# Patient Record
Sex: FEMALE | Race: BLACK OR AFRICAN AMERICAN | NOT HISPANIC OR LATINO | URBAN - METROPOLITAN AREA
[De-identification: names, ages, dates, MRNs, and addresses within clinical notes are randomized per-mention and may not be internally consistent; named-entity substitution may affect disease eponyms.]

---

## 2024-08-28 ENCOUNTER — INPATIENT (INPATIENT)
Facility: HOSPITAL | Age: 44
LOS: 2 days | Discharge: HOME | End: 2024-08-31
Attending: NEUROLOGICAL SURGERY | Admitting: NEUROLOGICAL SURGERY
Payer: COMMERCIAL

## 2024-08-28 VITALS
SYSTOLIC BLOOD PRESSURE: 130 MMHG | HEART RATE: 102 BPM | RESPIRATION RATE: 18 BRPM | WEIGHT: 293 LBS | OXYGEN SATURATION: 98 % | HEIGHT: 69 IN | DIASTOLIC BLOOD PRESSURE: 88 MMHG | TEMPERATURE: 100 F

## 2024-08-28 LAB
ADD ON TEST-SPECIMEN IN LAB: SIGNIFICANT CHANGE UP
ALBUMIN SERPL ELPH-MCNC: 4.3 G/DL — SIGNIFICANT CHANGE UP (ref 3.3–5)
ALP SERPL-CCNC: 60 U/L — SIGNIFICANT CHANGE UP (ref 40–120)
ALT FLD-CCNC: 14 U/L — SIGNIFICANT CHANGE UP (ref 10–45)
ANION GAP SERPL CALC-SCNC: 13 MMOL/L — SIGNIFICANT CHANGE UP (ref 5–17)
APPEARANCE UR: CLEAR — SIGNIFICANT CHANGE UP
AST SERPL-CCNC: 20 U/L — SIGNIFICANT CHANGE UP (ref 10–40)
BACTERIA # UR AUTO: NEGATIVE /HPF — SIGNIFICANT CHANGE UP
BASOPHILS # BLD AUTO: 0.02 K/UL — SIGNIFICANT CHANGE UP (ref 0–0.2)
BASOPHILS NFR BLD AUTO: 0.3 % — SIGNIFICANT CHANGE UP (ref 0–2)
BILIRUB SERPL-MCNC: 0.5 MG/DL — SIGNIFICANT CHANGE UP (ref 0.2–1.2)
BILIRUB UR-MCNC: NEGATIVE — SIGNIFICANT CHANGE UP
BLD GP AB SCN SERPL QL: NEGATIVE — SIGNIFICANT CHANGE UP
BUN SERPL-MCNC: 12 MG/DL — SIGNIFICANT CHANGE UP (ref 7–23)
CALCIUM SERPL-MCNC: 9.3 MG/DL — SIGNIFICANT CHANGE UP (ref 8.4–10.5)
CHLORIDE SERPL-SCNC: 105 MMOL/L — SIGNIFICANT CHANGE UP (ref 96–108)
CO2 SERPL-SCNC: 22 MMOL/L — SIGNIFICANT CHANGE UP (ref 22–31)
COLOR SPEC: YELLOW — SIGNIFICANT CHANGE UP
CREAT SERPL-MCNC: 0.76 MG/DL — SIGNIFICANT CHANGE UP (ref 0.5–1.3)
CRP SERPL-MCNC: 11.9 MG/L — HIGH (ref 0–4)
D DIMER BLD IA.RAPID-MCNC: <150 NG/ML DDU — SIGNIFICANT CHANGE UP
DIFF PNL FLD: NEGATIVE — SIGNIFICANT CHANGE UP
EGFR: 100 ML/MIN/1.73M2 — SIGNIFICANT CHANGE UP
EOSINOPHIL # BLD AUTO: 0.1 K/UL — SIGNIFICANT CHANGE UP (ref 0–0.5)
EOSINOPHIL NFR BLD AUTO: 1.6 % — SIGNIFICANT CHANGE UP (ref 0–6)
ERYTHROCYTE [SEDIMENTATION RATE] IN BLOOD: 33 MM/HR — HIGH
GLUCOSE BLDC GLUCOMTR-MCNC: 101 MG/DL — HIGH (ref 70–99)
GLUCOSE SERPL-MCNC: 87 MG/DL — SIGNIFICANT CHANGE UP (ref 70–99)
GLUCOSE UR QL: NEGATIVE MG/DL — SIGNIFICANT CHANGE UP
HCG SERPL-ACNC: <1 MIU/ML — SIGNIFICANT CHANGE UP
HCT VFR BLD CALC: 38 % — SIGNIFICANT CHANGE UP (ref 34.5–45)
HGB BLD-MCNC: 11.6 G/DL — SIGNIFICANT CHANGE UP (ref 11.5–15.5)
IMM GRANULOCYTES NFR BLD AUTO: 0.2 % — SIGNIFICANT CHANGE UP (ref 0–0.9)
KETONES UR-MCNC: 40 MG/DL
LACTATE SERPL-SCNC: 1.4 MMOL/L — SIGNIFICANT CHANGE UP (ref 0.5–2)
LEUKOCYTE ESTERASE UR-ACNC: ABNORMAL
LYMPHOCYTES # BLD AUTO: 1.61 K/UL — SIGNIFICANT CHANGE UP (ref 1–3.3)
LYMPHOCYTES # BLD AUTO: 25.7 % — SIGNIFICANT CHANGE UP (ref 13–44)
MCHC RBC-ENTMCNC: 24 PG — LOW (ref 27–34)
MCHC RBC-ENTMCNC: 30.5 GM/DL — LOW (ref 32–36)
MCV RBC AUTO: 78.5 FL — LOW (ref 80–100)
MONOCYTES # BLD AUTO: 0.51 K/UL — SIGNIFICANT CHANGE UP (ref 0–0.9)
MONOCYTES NFR BLD AUTO: 8.1 % — SIGNIFICANT CHANGE UP (ref 2–14)
NEUTROPHILS # BLD AUTO: 4.02 K/UL — SIGNIFICANT CHANGE UP (ref 1.8–7.4)
NEUTROPHILS NFR BLD AUTO: 64.1 % — SIGNIFICANT CHANGE UP (ref 43–77)
NITRITE UR-MCNC: NEGATIVE — SIGNIFICANT CHANGE UP
NRBC # BLD: 0 /100 WBCS — SIGNIFICANT CHANGE UP (ref 0–0)
NT-PROBNP SERPL-SCNC: <36 PG/ML — SIGNIFICANT CHANGE UP (ref 0–300)
PH UR: 6 — SIGNIFICANT CHANGE UP (ref 5–8)
PLATELET # BLD AUTO: 206 K/UL — SIGNIFICANT CHANGE UP (ref 150–400)
POTASSIUM SERPL-MCNC: 5 MMOL/L — SIGNIFICANT CHANGE UP (ref 3.5–5.3)
POTASSIUM SERPL-SCNC: 5 MMOL/L — SIGNIFICANT CHANGE UP (ref 3.5–5.3)
PROT SERPL-MCNC: 8 G/DL — SIGNIFICANT CHANGE UP (ref 6–8.3)
PROT UR-MCNC: NEGATIVE MG/DL — SIGNIFICANT CHANGE UP
RBC # BLD: 4.84 M/UL — SIGNIFICANT CHANGE UP (ref 3.8–5.2)
RBC # FLD: 15.7 % — HIGH (ref 10.3–14.5)
RBC CASTS # UR COMP ASSIST: 1 /HPF — SIGNIFICANT CHANGE UP (ref 0–4)
RH IG SCN BLD-IMP: POSITIVE — SIGNIFICANT CHANGE UP
SODIUM SERPL-SCNC: 140 MMOL/L — SIGNIFICANT CHANGE UP (ref 135–145)
SP GR SPEC: 1.02 — SIGNIFICANT CHANGE UP (ref 1–1.03)
SQUAMOUS # UR AUTO: 3 /HPF — SIGNIFICANT CHANGE UP (ref 0–5)
TROPONIN T, HIGH SENSITIVITY RESULT: <6 NG/L — SIGNIFICANT CHANGE UP (ref 0–51)
UROBILINOGEN FLD QL: 0.2 MG/DL — SIGNIFICANT CHANGE UP (ref 0.2–1)
WBC # BLD: 6.27 K/UL — SIGNIFICANT CHANGE UP (ref 3.8–10.5)
WBC # FLD AUTO: 6.27 K/UL — SIGNIFICANT CHANGE UP (ref 3.8–10.5)
WBC UR QL: 9 /HPF — HIGH (ref 0–5)

## 2024-08-28 PROCEDURE — 71046 X-RAY EXAM CHEST 2 VIEWS: CPT | Mod: 26

## 2024-08-28 PROCEDURE — 99285 EMERGENCY DEPT VISIT HI MDM: CPT

## 2024-08-28 PROCEDURE — 72157 MRI CHEST SPINE W/O & W/DYE: CPT | Mod: 26,MC

## 2024-08-28 PROCEDURE — 93010 ELECTROCARDIOGRAM REPORT: CPT

## 2024-08-28 RX ORDER — KETOROLAC TROMETHAMINE 30 MG/ML
15 INJECTION, SOLUTION INTRAMUSCULAR ONCE
Refills: 0 | Status: DISCONTINUED | OUTPATIENT
Start: 2024-08-28 | End: 2024-08-28

## 2024-08-28 RX ORDER — ACETAMINOPHEN 325 MG/1
650 TABLET ORAL ONCE
Refills: 0 | Status: COMPLETED | OUTPATIENT
Start: 2024-08-28 | End: 2024-08-28

## 2024-08-28 RX ORDER — SODIUM CHLORIDE 9 MG/ML
1000 INJECTION INTRAMUSCULAR; INTRAVENOUS; SUBCUTANEOUS
Refills: 0 | Status: DISCONTINUED | OUTPATIENT
Start: 2024-08-28 | End: 2024-08-29

## 2024-08-28 RX ORDER — SENNA 187 MG
2 TABLET ORAL AT BEDTIME
Refills: 0 | Status: DISCONTINUED | OUTPATIENT
Start: 2024-08-28 | End: 2024-08-29

## 2024-08-28 RX ORDER — POLYETHYLENE GLYCOL 3350 17 G/17G
17 POWDER, FOR SOLUTION ORAL DAILY
Refills: 0 | Status: DISCONTINUED | OUTPATIENT
Start: 2024-08-28 | End: 2024-08-31

## 2024-08-28 RX ORDER — SODIUM CHLORIDE 9 MG/ML
1000 INJECTION INTRAMUSCULAR; INTRAVENOUS; SUBCUTANEOUS ONCE
Refills: 0 | Status: COMPLETED | OUTPATIENT
Start: 2024-08-28 | End: 2024-08-28

## 2024-08-28 RX ORDER — SODIUM CHLORIDE 9 MG/ML
1000 INJECTION INTRAMUSCULAR; INTRAVENOUS; SUBCUTANEOUS
Refills: 0 | Status: DISCONTINUED | OUTPATIENT
Start: 2024-08-28 | End: 2024-08-28

## 2024-08-28 RX ADMIN — KETOROLAC TROMETHAMINE 15 MILLIGRAM(S): 30 INJECTION, SOLUTION INTRAMUSCULAR at 15:02

## 2024-08-28 RX ADMIN — KETOROLAC TROMETHAMINE 15 MILLIGRAM(S): 30 INJECTION, SOLUTION INTRAMUSCULAR at 19:44

## 2024-08-28 RX ADMIN — ACETAMINOPHEN 650 MILLIGRAM(S): 325 TABLET ORAL at 19:44

## 2024-08-28 RX ADMIN — SODIUM CHLORIDE 1000 MILLILITER(S): 9 INJECTION INTRAMUSCULAR; INTRAVENOUS; SUBCUTANEOUS at 19:53

## 2024-08-28 RX ADMIN — ACETAMINOPHEN 650 MILLIGRAM(S): 325 TABLET ORAL at 15:03

## 2024-08-28 RX ADMIN — SODIUM CHLORIDE 1000 MILLILITER(S): 9 INJECTION INTRAMUSCULAR; INTRAVENOUS; SUBCUTANEOUS at 15:02

## 2024-08-28 NOTE — ED PROVIDER NOTE - EKG #1 DATE/TIME
Problem: Falls - Risk of 
Goal: *Absence of Falls Document Christiano Leo Fall Risk and appropriate interventions in the flowsheet. Outcome: Progressing Towards Goal 
Fall Risk Interventions: 
Mobility Interventions: Communicate number of staff needed for ambulation/transfer Medication Interventions: Assess postural VS orthostatic hypotension Elimination Interventions: Call light in reach History of Falls Interventions: Consult care management for discharge planning 28-Aug-2024 14:50

## 2024-08-28 NOTE — H&P ADULT - NSHPPHYSICALEXAM_GEN_ALL_CORE
Constitutional: Pt found in bed in NAD   ENT: PERRL, EOMi  Neck: supple   Respiratory: breathing non-labored, symmetrical chest wall movement  Cardiovascuar: RRR, no murmurs  Gastrointestinal: abdomen soft, non tender  Extr: well  perfused  Skin: dry, warm   Neurological:  AAOX3. Verbal function intact  Cranial Nerves: II-XII intact  Motor: 5/5 power in b/l UE and LE  Sensation: intact to light touch in all extremities  Pronator Drift: negative

## 2024-08-28 NOTE — H&P ADULT - NSHPLABSRESULTS_GEN_ALL_CORE
< from: MR Thoracic Spine w/wo IV Cont (08.28.24 @ 18:11) >    IMPRESSION:    1.  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  2.  Correlate clinically to exclude steroid excess. Consider surgical   consultation.        Patient Name: ELOY SOUTH  MRN: [id number], Accession: [id number]  DOS: 08/28/24 06:11 PM    --- End of Report ---      < end of copied text >

## 2024-08-28 NOTE — ED PROVIDER NOTE - CLINICAL SUMMARY MEDICAL DECISION MAKING FREE TEXT BOX
Age appropriate behavior 44F denies PMH p/w pain/tingling. Pt returned from [facility] 6days ago. 4d ago she developed pain to back to area between shoulder blades, constant. Feels "shock" to that area when she takes a deep breath. Went to  where she had blood work taken "to evaluate for clot" and was reportedly normal. Was prescribed flexeril w/o relief. Since yesterday she also feels numbness/tingling to b/l abdomen, radiating down anterior RLE to knee region and down anterior LLE to distal leg. Denies any actual weakness but states that her gait is slightly off because of the tingling feeling in her lower extremities. No other systemic symptoms.   99.9 oral temp, borderline tachycardia, other vitals wnl. Exam as above.   ddx: Unclear etiology. Low suspicion PE (no SOB/CP). Low suspicion significant spinal pathology - however pt has low grade temp and seemingly bothersome subjective paresthesias. This raises possibility for compression or spinal infectious process.   Labs, d-dimer, MRI, IVF/attempt symptom control, reassess.

## 2024-08-28 NOTE — H&P ADULT - HISTORY OF PRESENT ILLNESS
45 yo female with no significant PMH presented to hospital c/o pain in her back in between her shoulder blades that started last Thursday 8/22, says it "feels like a shock" when she takes deep breaths. Pt went to urgent care and was prescribed muscle relaxants with no relief of symptoms. Pt reports pain then increased and new symptoms of b/l LE tingling and numbness to her anterior thighs with left being worse than right started 8/27. Pt stated tingling radiates down anterior aspect of LLE down to ankle and RLE down to knee. Denies saddles anesthesia, bowel/bladder incontinence, difficulty with ambulation or weakness.

## 2024-08-28 NOTE — ED ADULT TRIAGE NOTE - ARRIVAL FROM
ammonium lactate topical cream: Apply topically to affected area 2 times a day  Artificial Tears ophthalmic solution: 1 drop(s) to each affected eye 4 times a day  Dextrose 5% in Lactated Ringers intravenous solution: 100 milliliter(s) intravenous every hour   mirtazapine 15 mg oral tablet: 1 tab(s) orally once a day (at bedtime)  Multiple Vitamins oral tablet: 1 tab(s) orally once a day  Senna 8.6 mg oral tablet: 1 tab(s) orally once a day (at bedtime) Home

## 2024-08-28 NOTE — H&P ADULT - ASSESSMENT
45 yo female with no significant PMH presented to hospital c/o pain in her back in between her shoulder blades that started last Thursday 8/22, pain then increased and new symptoms of b/l LE tingling and numbness to her anterior thighs with left being worse than right started 8/27. Pt stated tingling radiates down anterior aspect of LLE down to ankle and RLE down to knee. Admit to [facility] for plan for OR 8/29 for L7-8 laminectomy.     Neuro:  - neuro/vital checks q1h  - MRI w/ and w/o 8/28: L5-7 epidural lesion  - plan for OR 8/29    Cardio:  - SBP <140    Pulm:  - RA    GI:  - NPO  - bowel regimen     Renal:  - IVF   - voiding    Endo:  - ISS   - f/u A1C    Heme:   - SCDs for DVT ppx    ID:  - afebrile   - elevated ESR/CRP     D/w Dr. D'amico

## 2024-08-28 NOTE — ED PROVIDER NOTE - OBJECTIVE STATEMENT
44F denies PMH p/w pain/tingling. Pt returned from [facility] 6days ago. 4d ago she developed pain to back to area between shoulder blades, constant. Feels "shock" to that area when she takes a deep breath. Went to  where she had blood work taken "to evaluate for clot" and was reportedly normal. Was prescribed flexeril w/o relief. Since yesterday she also feels numbness/tingling to b/l abdomen, radiating down anterior RLE to knee region and down anterior LLE to distal leg. Denies any actual weakness but states that her gait is slightly off because of the tingling feeling in her lower extremities. No other systemic symptoms.   Denies lower back pain or other back pain. Denies HA, URI symptoms, dizziness/lightheaded, f/c, SOB, CP, NVD, abd pain, urinary complaints, incontinence. Cannot recall specific precipitating trauma.

## 2024-08-28 NOTE — ED ADULT NURSE NOTE - OBJECTIVE STATEMENT
Patient c/o of 2 weeks increasing lower back pain, radiates to abdomen and bilateral lower legs w/ numbness and tingling, no neck tenderness no bladder/bowel dysfunction, States symptoms started after travel to [facility] 2 weeks ago.  Went to , tested negative for blood clot.  Was prescribed muscle relaxants, taking Ibuprofen w/ some relief, none taken today.

## 2024-08-28 NOTE — H&P ADULT - NSHPREVIEWOFSYSTEMS_GEN_ALL_CORE
General:	no recent illnesses, no recent wt gain/loss, no chills  Skin/Breast:  no rash, lumps, new moles, erythema, tenderness  Ophthalmologic:  no change in vision, diplopia, pain, redness, tearing, dry eyes	  ENMT:	no hearing loss, tinnitus, ear pain, vertigo, nasal congestion, epistaxis, sore throat  Respiratory and Thorax: no coughing, wheezing, recent URI, shortness of breath	  Cardiovascular: no chest pain, MCFARLANE, leg swelling, irregular rhythm   Gastrointestinal:	no nausea, vomiting, diarrhea, abd pain, bloody stool, heartburn  Genitourinary: no frequency, dysuria, hematuria  Musculoskeletal:	no joint pain, no joint swelling, no tenderness  Neurological:	 see HPI  Psychiatric:	no confusion, no anxiousness, no depression   Hematology/Lymphatics:	no brusing, easy bleeding, LAD  Endocrine:  	no excess urination/thirst, heat/cold intolerance  Allergic/Immunologic:  no urticaria, sneezing, recurrent infections

## 2024-08-28 NOTE — ED PROVIDER NOTE - PROGRESS NOTE DETAILS
Klepfish: 100.1 rectal temp, CRP 11.9, ESR 33, other labs including d-dimer grossly wnl. CXR wnl. Pt just went to MRI. Will reassess. Klepfish: UA w/ small leuk esterase, 9 WBCs, negative bacteria - has no urinary complaints, very unlikely UTI. MRI showed "IMPRESSION: 1.  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  2.  Correlate clinically to exclude steroid excess. Consider surgical consultation." Neurosurg consulted. Updated pt. Lars: Rediscussed w/ neurosurg, will admit for further care. Updated pt/ at bedside.

## 2024-08-28 NOTE — ED PROVIDER NOTE - HIV OFFER
Opt out Partial Purse String (Simple) Text: Given the location of the defect and the characteristics of the surrounding skin a simple purse string closure was deemed most appropriate.  Undermining was performed circumfirentially around the surgical defect.  A purse string suture was then placed and tightened. Wound tension only allowed a partial closure of the circular defect.

## 2024-08-28 NOTE — ED PROVIDER NOTE - PHYSICAL EXAMINATION
PERRL, EOMI, no nystagmus. CN intact. Strength 5/5. Steady unassisted gait. No pronator drift. Sensation intact. Normal speech, no dysarthria. No temporal ttp, no nuchal rigidity. Negative Romberg.  normal dorsi/plantar flexion, no saddle anesthesia   No spinal ttp, neck FROM. Strength 5/5. No bony ttp, FROM all extremities. Normal equal distal pulses. Steady unassisted gait.

## 2024-08-29 PROBLEM — Z00.00 ENCOUNTER FOR PREVENTIVE HEALTH EXAMINATION: Status: ACTIVE | Noted: 2024-08-29

## 2024-08-29 LAB
A1C WITH ESTIMATED AVERAGE GLUCOSE RESULT: 5.1 % — SIGNIFICANT CHANGE UP (ref 4–5.6)
ANION GAP SERPL CALC-SCNC: 9 MMOL/L — SIGNIFICANT CHANGE UP (ref 5–17)
BLD GP AB SCN SERPL QL: NEGATIVE — SIGNIFICANT CHANGE UP
BUN SERPL-MCNC: 9 MG/DL — SIGNIFICANT CHANGE UP (ref 7–23)
CALCIUM SERPL-MCNC: 8.8 MG/DL — SIGNIFICANT CHANGE UP (ref 8.4–10.5)
CHLORIDE SERPL-SCNC: 105 MMOL/L — SIGNIFICANT CHANGE UP (ref 96–108)
CO2 SERPL-SCNC: 22 MMOL/L — SIGNIFICANT CHANGE UP (ref 22–31)
CREAT SERPL-MCNC: 0.78 MG/DL — SIGNIFICANT CHANGE UP (ref 0.5–1.3)
EGFR: 97 ML/MIN/1.73M2 — SIGNIFICANT CHANGE UP
ESTIMATED AVERAGE GLUCOSE: 100 MG/DL — SIGNIFICANT CHANGE UP (ref 68–114)
GLUCOSE BLDC GLUCOMTR-MCNC: 93 MG/DL — SIGNIFICANT CHANGE UP (ref 70–99)
GLUCOSE BLDC GLUCOMTR-MCNC: 96 MG/DL — SIGNIFICANT CHANGE UP (ref 70–99)
GLUCOSE BLDC GLUCOMTR-MCNC: 99 MG/DL — SIGNIFICANT CHANGE UP (ref 70–99)
GLUCOSE SERPL-MCNC: 100 MG/DL — HIGH (ref 70–99)
HAV IGM SER-ACNC: SIGNIFICANT CHANGE UP
HBV CORE AB SER-ACNC: SIGNIFICANT CHANGE UP
HBV CORE IGM SER-ACNC: SIGNIFICANT CHANGE UP
HBV SURFACE AB SER-ACNC: SIGNIFICANT CHANGE UP
HBV SURFACE AG SER-ACNC: SIGNIFICANT CHANGE UP
HCT VFR BLD CALC: 35 % — SIGNIFICANT CHANGE UP (ref 34.5–45)
HCV AB S/CO SERPL IA: 0.05 S/CO — SIGNIFICANT CHANGE UP
HCV AB SERPL-IMP: SIGNIFICANT CHANGE UP
HGB BLD-MCNC: 11 G/DL — LOW (ref 11.5–15.5)
HIV 1+2 AB+HIV1 P24 AG SERPL QL IA: SIGNIFICANT CHANGE UP
LDH SERPL L TO P-CCNC: 189 U/L — SIGNIFICANT CHANGE UP (ref 50–242)
MAGNESIUM SERPL-MCNC: 2 MG/DL — SIGNIFICANT CHANGE UP (ref 1.6–2.6)
MCHC RBC-ENTMCNC: 24.7 PG — LOW (ref 27–34)
MCHC RBC-ENTMCNC: 31.4 GM/DL — LOW (ref 32–36)
MCV RBC AUTO: 78.7 FL — LOW (ref 80–100)
NRBC # BLD: 0 /100 WBCS — SIGNIFICANT CHANGE UP (ref 0–0)
PHOSPHATE SERPL-MCNC: 2.9 MG/DL — SIGNIFICANT CHANGE UP (ref 2.5–4.5)
PLATELET # BLD AUTO: 211 K/UL — SIGNIFICANT CHANGE UP (ref 150–400)
POTASSIUM SERPL-MCNC: 4.1 MMOL/L — SIGNIFICANT CHANGE UP (ref 3.5–5.3)
POTASSIUM SERPL-SCNC: 4.1 MMOL/L — SIGNIFICANT CHANGE UP (ref 3.5–5.3)
RBC # BLD: 4.45 M/UL — SIGNIFICANT CHANGE UP (ref 3.8–5.2)
RBC # FLD: 15.7 % — HIGH (ref 10.3–14.5)
RH IG SCN BLD-IMP: POSITIVE — SIGNIFICANT CHANGE UP
SODIUM SERPL-SCNC: 136 MMOL/L — SIGNIFICANT CHANGE UP (ref 135–145)
URATE SERPL-MCNC: 4.5 MG/DL — SIGNIFICANT CHANGE UP (ref 2.5–7)
WBC # BLD: 7.41 K/UL — SIGNIFICANT CHANGE UP (ref 3.8–10.5)
WBC # FLD AUTO: 7.41 K/UL — SIGNIFICANT CHANGE UP (ref 3.8–10.5)

## 2024-08-29 PROCEDURE — 71260 CT THORAX DX C+: CPT | Mod: 26

## 2024-08-29 PROCEDURE — 99291 CRITICAL CARE FIRST HOUR: CPT

## 2024-08-29 PROCEDURE — 74177 CT ABD & PELVIS W/CONTRAST: CPT | Mod: 26

## 2024-08-29 PROCEDURE — 99231 SBSQ HOSP IP/OBS SF/LOW 25: CPT

## 2024-08-29 PROCEDURE — 99222 1ST HOSP IP/OBS MODERATE 55: CPT

## 2024-08-29 PROCEDURE — 78815 PET IMAGE W/CT SKULL-THIGH: CPT | Mod: 26,PI

## 2024-08-29 RX ORDER — CHLORHEXIDINE GLUCONATE 40 MG/ML
1 SOLUTION TOPICAL EVERY 12 HOURS
Refills: 0 | Status: COMPLETED | OUTPATIENT
Start: 2024-08-29 | End: 2024-08-29

## 2024-08-29 RX ORDER — APREPITANT 40 MG/1
40 CAPSULE ORAL ONCE
Refills: 0 | Status: DISCONTINUED | OUTPATIENT
Start: 2024-08-29 | End: 2024-08-29

## 2024-08-29 RX ORDER — CHLORHEXIDINE GLUCONATE 40 MG/ML
1 SOLUTION TOPICAL
Refills: 0 | Status: DISCONTINUED | OUTPATIENT
Start: 2024-08-29 | End: 2024-08-31

## 2024-08-29 RX ORDER — ACETAMINOPHEN 325 MG/1
1000 TABLET ORAL EVERY 8 HOURS
Refills: 0 | Status: DISCONTINUED | OUTPATIENT
Start: 2024-08-29 | End: 2024-08-31

## 2024-08-29 RX ORDER — SODIUM CHLORIDE 9 MG/ML
1000 INJECTION INTRAMUSCULAR; INTRAVENOUS; SUBCUTANEOUS
Refills: 0 | Status: DISCONTINUED | OUTPATIENT
Start: 2024-08-29 | End: 2024-08-29

## 2024-08-29 RX ORDER — POVIDONE-IODINE 10 %
1 SOLUTION, NON-ORAL TOPICAL ONCE
Refills: 0 | Status: DISCONTINUED | OUTPATIENT
Start: 2024-08-29 | End: 2024-08-29

## 2024-08-29 RX ORDER — SODIUM CHLORIDE 9 MG/ML
1000 INJECTION INTRAMUSCULAR; INTRAVENOUS; SUBCUTANEOUS
Refills: 0 | Status: DISCONTINUED | OUTPATIENT
Start: 2024-08-29 | End: 2024-08-30

## 2024-08-29 RX ORDER — ACETAMINOPHEN 325 MG/1
1000 TABLET ORAL ONCE
Refills: 0 | Status: DISCONTINUED | OUTPATIENT
Start: 2024-08-29 | End: 2024-08-29

## 2024-08-29 RX ADMIN — CHLORHEXIDINE GLUCONATE 1 APPLICATION(S): 40 SOLUTION TOPICAL at 06:40

## 2024-08-29 RX ADMIN — CHLORHEXIDINE GLUCONATE 1 APPLICATION(S): 40 SOLUTION TOPICAL at 17:22

## 2024-08-29 RX ADMIN — SODIUM CHLORIDE 150 MILLILITER(S): 9 INJECTION INTRAMUSCULAR; INTRAVENOUS; SUBCUTANEOUS at 00:33

## 2024-08-29 RX ADMIN — SODIUM CHLORIDE 100 MILLILITER(S): 9 INJECTION INTRAMUSCULAR; INTRAVENOUS; SUBCUTANEOUS at 02:28

## 2024-08-29 NOTE — PROGRESS NOTE ADULT - SUBJECTIVE AND OBJECTIVE BOX
INTERVAL HISTORY: HPI:  43 yo female with no significant PMH presented to hospital c/o pain in her back in between her shoulder blades that started last Thursday 8/22, says it "feels like a shock" when she takes deep breaths. Pt went to urgent care and was prescribed muscle relaxants with no relief of symptoms. Pt reports pain then increased and new symptoms of b/l LE tingling and numbness to her anterior thighs with left being worse than right started 8/27. Pt stated tingling radiates down anterior aspect of LLE down to ankle and RLE down to knee. Denies saddles anesthesia, bowel/bladder incontinence, difficulty with ambulation or weakness.  (28 Aug 2024 23:43)      MEDICATIONS  (STANDING):  chlorhexidine 2% Cloths 1 Application(s) Topical <User Schedule>  polyethylene glycol 3350 17 Gram(s) Oral daily  sodium chloride 0.9%. 1000 milliLiter(s) (75 mL/Hr) IV Continuous <Continuous>    MEDICATIONS  (PRN):  acetaminophen     Tablet .. 1000 milliGRAM(s) Oral every 8 hours PRN Mild Pain (1 - 3)      Drug Dosing Weight  Height (cm): 175.3 (28 Aug 2024 13:48)  Weight (kg): 141.5 (28 Aug 2024 13:48)  BMI (kg/m2): 46 (28 Aug 2024 13:48)  BSA (m2): 2.5 (28 Aug 2024 13:48)    PAST MEDICAL & SURGICAL HISTORY:  Uterine fibroid      REVIEW OF SYSTEMS: [ ] Unable to Assess due to neurologic exam   [ ] All ROS addressed below are non-contributory, except:  Neuro: [ ] Headache [ ] Back pain [ ] Numbness [ ] Weakness [ ] Ataxia [ ] Dizziness [ ] Aphasia [ ] Dysarthria [ ] Visual disturbance  Resp: [ ] Shortness of breath/dyspnea, [ ] Orthopnea [ ] Cough  CV: [ ] Chest pain [ ] Palpitation [ ] Lightheadedness [ ] Syncope  Renal: [ ] Thirst [ ] Edema  GI: [ ] Nausea [ ] Emesis [ ] Abdominal pain [ ] Constipation [ ] Diarrhea  Hem: [ ] Hematemesis [ ] bright red blood per rectum  ID: [ ] Fever [ ] Chills [ ] Dysuria  ENT: [ ] Rhinorrhea    PHYSICAL EXAM:    General: No Acute Distress     Neurological: Awake, alert oriented to person, place and time, Following Commands, PERRL, EOMI, Face Symmetrical, Speech Fluent, Moving all extremities, Muscle Strength normal in all four extremities, No Drift, Sensation to Light Touch Intact    Pulmonary: Clear to Auscultation, No Rales, No Rhonchi, No Wheezes     Cardiovascular: S1, S2, Regular Rate and Rhythm     Gastrointestinal: Soft, Nontender, Nondistended     Extremities: No calf tenderness     Incision:     ICU Vital Signs Last 24 Hrs  T(C): 37.2 (29 Aug 2024 22:28), Max: 37.2 (29 Aug 2024 17:49)  T(F): 99 (29 Aug 2024 22:28), Max: 99 (29 Aug 2024 22:28)  HR: 87 (29 Aug 2024 22:00) (81 - 94)  BP: 139/68 (29 Aug 2024 22:00) (125/74 - 147/94)  BP(mean): 95 (29 Aug 2024 22:00) (89 - 115)  RR: 22 (29 Aug 2024 22:00) (14 - 22)  SpO2: 100% (29 Aug 2024 22:00) (98% - 100%)    O2 Parameters below as of 29 Aug 2024 19:00  Patient On (Oxygen Delivery Method): room air            I&O's Detail    28 Aug 2024 07:01  -  29 Aug 2024 07:00  --------------------------------------------------------  IN:    sodium chloride 0.9%: 300 mL    sodium chloride 0.9%: 600 mL  Total IN: 900 mL    OUT:    Voided (mL): 1125 mL  Total OUT: 1125 mL    Total NET: -225 mL      29 Aug 2024 07:01  -  29 Aug 2024 23:07  --------------------------------------------------------  IN:    sodium chloride 0.9%: 300 mL  Total IN: 300 mL    OUT:    Voided (mL): 1025 mL  Total OUT: 1025 mL    Total NET: -725 mL              LABS:  CBC Full  -  ( 29 Aug 2024 18:09 )  WBC Count : 7.41 K/uL  RBC Count : 4.45 M/uL  Hemoglobin : 11.0 g/dL  Hematocrit : 35.0 %  Platelet Count - Automated : 211 K/uL  Mean Cell Volume : 78.7 fl  Mean Cell Hemoglobin : 24.7 pg  Mean Cell Hemoglobin Concentration : 31.4 gm/dL  Auto Neutrophil # : x  Auto Lymphocyte # : x  Auto Monocyte # : x  Auto Eosinophil # : x  Auto Basophil # : x  Auto Neutrophil % : x  Auto Lymphocyte % : x  Auto Monocyte % : x  Auto Eosinophil % : x  Auto Basophil % : x    08-29    136  |  105  |  9   ----------------------------<  100<H>  4.1   |  22  |  0.78    Ca    8.8      29 Aug 2024 18:09  Phos  2.9     08-29  Mg     2.0     08-29    TPro  8.0  /  Alb  4.3  /  TBili  0.5  /  DBili  x   /  AST  20  /  ALT  14  /  AlkPhos  60  08-28    PT/INR - ( 28 Aug 2024 15:01 )   PT: 11.5 sec;   INR: 1.01         Urinalysis Basic - ( 29 Aug 2024 18:09 )    Color: x / Appearance: x / SG: x / pH: x  Gluc: 100 mg/dL / Ketone: x  / Bili: x / Urobili: x   Blood: x / Protein: x / Nitrite: x   Leuk Esterase: x / RBC: x / WBC x   Sq Epi: x / Non Sq Epi: x / Bacteria: x    RADIOLOGY & ADDITIONAL STUDIES:  < from: MR Thoracic Spine w/wo IV Cont (08.28.24 @ 18:11) >  T3-4: Mild narrowing of the central canal.  T4-5: Moderate narrowing of the central canal, with effacement of CSF   signal in the thecal sac.  T5-6, T6-7: Moderate to severe central canal stenosis. Complete effacement of CSF signal, with flattening of the left dorsal cord, which appears compressed. Clinical correlation will be required to exclude acute myelopathic symptoms, which should be a clinical diagnosis.   Consider surgical consultation.  T7-8: Moderate narrowing of the central canal.  T8-9: Mild narrowing of the central canal.  T9-T10: Shallow right paracentral protrusion and bilateral facet arthritis, with mild narrowing of the central canal.    The central canal and neural foramina in the thoracic spine are otherwise negative. Vertebral body height, alignment, marrow, and cord are otherwise negative.  The paraspinal musculature is normal in signal and bulk.    IMPRESSION:    1.  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  2.  Correlate clinically to exclude steroid excess. Consider surgical consultation.    < end of copied text >

## 2024-08-29 NOTE — CHART NOTE - NSCHARTNOTEFT_GEN_A_CORE
Pt's CT Chest/Abd/Pelvis, PET scan, and MRI spine results discussed at length with Dr. D'Amico and Dr. Vaz.     Pt and  verbalized understanding that Dr. D'Amico's recommendation is to proceed with surgery for biopsy of spinal epidural lesion and decompression of spinal cord. Without this surgery, patient may develop permanent neurological deficits including significant weakness. Patient understands risks of foregoing surgery and wishes to proceed with IR biopsy of lymph node at this time.

## 2024-08-29 NOTE — PROGRESS NOTE ADULT - SUBJECTIVE AND OBJECTIVE BOX
HPI:  45 yo female with no significant PMH presented to hospital c/o pain in her back in between her shoulder blades that started last , says it "feels like a shock" when she takes deep breaths. Pt went to urgent care and was prescribed muscle relaxants with no relief of symptoms. Pt reports pain then increased and new symptoms of b/l LE tingling and numbness to her anterior thighs with left being worse than right started . Pt stated tingling radiates down anterior aspect of LLE down to ankle and RLE down to knee. Denies saddles anesthesia, bowel/bladder incontinence, difficulty with ambulation or weakness.      S/Overnight events:  Admitted to NSICU. Refusing CT CAP overnight. Will resume discussion about OR in AM.     Hospital Course:   : Admitted to NSICU.     Vital Signs Last 24 Hrs  T(C): 36.7 (28 Aug 2024 23:00), Max: 37.8 (28 Aug 2024 15:22)  T(F): 98.1 (28 Aug 2024 23:00), Max: 100.1 (28 Aug 2024 15:22)  HR: 94 (29 Aug 2024 01:00) (90 - 104)  BP: 141/78 (29 Aug 2024 00:00) (120/81 - 158/89)  BP(mean): 103 (29 Aug 2024 00:00) (103 - 117)  RR: 16 (29 Aug 2024 00:00) (16 - 18)  SpO2: 100% (29 Aug 2024 01:00) (98% - 100%)  Parameters below as of 29 Aug 2024 00:00  Patient On (Oxygen Delivery Method): room air    I&O's Detail  I&O's Summary    PHYSICAL EXAM:  Constitutional: NAD, resting comfortably in bed.   HEENT: Pupils equal, round, reactive to light. EOMI. Face symmetric, tongue midline.  Respiratory: No respiratory distress, lungs clear to auscultation bilaterally.   Cardiovascular: RRR, S1, S2.   Gastrointestinal: Abdomen soft, non tender, nondistended.  Neurological:  AAOX3. Opens eyes. Follows commands. Speech clear.  Cranial Nerves: II-XII intact  Motor: 5/5 power in b/l UE and LE  Sensation: intact to light touch in all extremities  Pronator Drift: none  Extremities: Warm, well perfused.  Wounds: none    TUBES/LINES:  [] CVC  [] A-line  [] Lumbar Drain  [] Ventriculostomy  [] Rodriguez  [] Other    DIET:  [x] NPO  [] Mechanical  [] Tube feeds    LABS:                        11.6   6.27  )-----------( 206      ( 28 Aug 2024 15:01 )             38.0     08-    140  |  105  |  12  ----------------------------<  87  5.0   |  22  |  0.76    Ca    9.3      28 Aug 2024 15:01    TPro  8.0  /  Alb  4.3  /  TBili  0.5  /  DBili  x   /  AST  20  /  ALT  14  /  AlkPhos  60  08-28    PT/INR - ( 28 Aug 2024 15:01 )   PT: 11.5 sec;   INR: 1.01            Urinalysis Basic - ( 28 Aug 2024 16:43 )    Color: Yellow / Appearance: Clear / S.020 / pH: x  Gluc: x / Ketone: 40 mg/dL  / Bili: Negative / Urobili: 0.2 mg/dL   Blood: x / Protein: Negative mg/dL / Nitrite: Negative   Leuk Esterase: Small / RBC: 1 /HPF / WBC 9 /HPF   Sq Epi: x / Non Sq Epi: 3 /HPF / Bacteria: Negative /HPF          CAPILLARY BLOOD GLUCOSE      POCT Blood Glucose.: 101 mg/dL (28 Aug 2024 23:56)      Drug Levels: [] N/A    CSF Analysis: [] N/A      Allergies    No Known Allergies    Intolerances      MEDICATIONS:  Antibiotics:    Neuro:    Anticoagulation:    OTHER:  chlorhexidine 2% Cloths 1 Application(s) Topical <User Schedule>  insulin lispro (ADMELOG) corrective regimen sliding scale   SubCutaneous Before meals and at bedtime  polyethylene glycol 3350 17 Gram(s) Oral daily  senna 2 Tablet(s) Oral at bedtime    IVF:  sodium chloride 0.9%. 1000 milliLiter(s) IV Continuous <Continuous>    CULTURES:    RADIOLOGY & ADDITIONAL TESTS:      ASSESSMENT:  45 yo female with no significant PMH presented to hospital c/o pain in her back in between her shoulder blades that started last , says it "feels like a shock" when she takes deep breaths. Pt went to urgent care and was prescribed muscle relaxants with no relief of symptoms. Pt reports pain then increased and new symptoms of b/l LE tingling and numbness to her anterior thighs with left being worse than right started . Pt stated tingling radiates down anterior aspect of LLE down to ankle and RLE down to knee. Denies saddles anesthesia, bowel/bladder incontinence, difficulty with ambulation or weakness.       Neuro:  - neuro/vitals q1  - pain control  - MR T spine: space-occupying process is noted in the left dorsal epidural space centered at T6, measuring approximately 7 x 11 x 30 mm  - pending CT CAP for metastatic workup    CV:  - SBP <160, MAP >65    Resp:  - RA    GI:  - NPO  - bowel reg    Renal:  - IVF  - voiding    Endo:  - ISS  - pending a1c    Heme:  - SCDs    ID:  - afebrile    Dispo: NSICU, pending OR    D/w Dr. D'Amico, Dr. Lovelace

## 2024-08-29 NOTE — CONSULT NOTE ADULT - ASSESSMENT
A 44-year-old female with no notable past medical history presented to the hospital with complaints of back pain located between her shoulder blades, which began on Thursday, August 22. She describes the pain as a "shock" when taking deep breaths. She initially visited urgent care, where she was prescribed muscle relaxants, but experienced no relief.    The pain worsened, and by August 27, she developed bilateral lower extremity tingling and numbness in her anterior thighs, with the left side being more affected than the right. The tingling extends from the anterior aspect of the left lower extremity down to the ankle and from the right lower extremity down to the knee. She denies experiencing saddle anesthesia, bowel or bladder incontinence, difficulty with walking, or any weakness.  The patient has no family history of cancer or hematologic disorders. 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    MR thoracic spine w/ A space-occupying process is noted in the left dorsal epidural space centered at T6, measuring approximately 7 x 11 x 30 mm. The lesion is hypointense before gadolinium, and becomes confluent with adjacent epidural fat on the T2 and postgadolinium images. There is lateral extension to the left T5-6 and T6-7 neural foramina, which are mildly narrowed.    Neurosurgery offering intervention however patient is reluctant to proceed with surgery as she "wants more information first".  She would like to see PET CT results prior to considering surgery.  I discussed with her that we will not be able to adequately treat her if we do not have pathology (if this is a malignancy).  She is concerned that surgery will "cause more inflammation", we discussed that if the mass is malignant there is the risk of growth leading to metastatic disease or worsening neurological side effects.  Patient aware of risks/benefits and mentioned she had a similiar discussion with the neurosurgical team.  She wants more time to discuss with her .     She agreed to additional blood tests as part of an oncologic work up however needs more time regarding biopsy (we mentioned that if she doesnt want surgery then she should still get a biopsy alone).  Discussions ongoing.        Oncology consulted for malignancy work up.      #Spinal lesion  -differential includes malignancy vs infectious process, ID following  -PET CT pending today   -would check LDH and beta 2 microglobulin, hepatitis (surface ab, ag and core ab), HIV testing  -can check TLS labs including uric acid, LDH and phos  -steroids per neurosurgery     oncology will continue to follow  to be discussed w/ oncologist Dr. Dinesh Vaz,  A 44-year-old female with no notable past medical history presented to the hospital with complaints of back pain located between her shoulder blades, which began on Thursday, August 22. She describes the pain as a "shock" when taking deep breaths. She initially visited urgent care, where she was prescribed muscle relaxants, but experienced no relief.    The pain worsened, and by August 27, she developed bilateral lower extremity tingling and numbness in her anterior thighs, with the left side being more affected than the right. The tingling extends from the anterior aspect of the left lower extremity down to the ankle and from the right lower extremity down to the knee. She denies experiencing saddle anesthesia, bowel or bladder incontinence, difficulty with walking, or any weakness.  The patient has no family history of cancer or hematologic disorders. 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    MR thoracic spine w/ A space-occupying process is noted in the left dorsal epidural space centered at T6, measuring approximately 7 x 11 x 30 mm. The lesion is hypointense before gadolinium, and becomes confluent with adjacent epidural fat on the T2 and postgadolinium images. There is lateral extension to the left T5-6 and T6-7 neural foramina, which are mildly narrowed.    Oncology consulted for malignancy work up.      Neurosurgery offering intervention however patient is reluctant to proceed with surgery as she "wants more information first".  She would like to see PET CT results prior to considering surgery.  I discussed with her that we will not be able to adequately treat her if we do not have pathology (if this is a malignancy).  She is concerned that surgery will "cause more inflammation", we discussed that if the mass is malignant there is the risk of growth leading to metastatic disease or worsening neurological side effects.  Patient aware of risks/benefits and mentioned she had a similiar discussion with the neurosurgical team.  She wants more time to discuss with her .     She agreed to additional blood tests as part of an oncologic work up however needs more time regarding biopsy (we mentioned that if she doesnt want surgery then she should still get a biopsy alone).  Discussions ongoing.        #Spinal lesion  -differential includes malignancy vs infectious process, ID following  -PET CT pending today   -would check LDH and beta 2 microglobulin, hepatitis (surface ab, ag and core ab), HIV testing  -can check TLS labs including uric acid, LDH and phos  -steroids per neurosurgery     oncology will continue to follow  to be discussed w/ oncologist Dr. Dinesh Vaz,  A 44-year-old female with no notable past medical history presented to the hospital with complaints of back pain located between her shoulder blades, which began on Thursday, August 22. She describes the pain as a "shock" when taking deep breaths. She initially visited urgent care, where she was prescribed muscle relaxants, but experienced no relief.    The pain worsened, and by August 27, she developed bilateral lower extremity tingling and numbness in her anterior thighs, with the left side being more affected than the right. The tingling extends from the anterior aspect of the left lower extremity down to the ankle and from the right lower extremity down to the knee. She denies experiencing saddle anesthesia, bowel or bladder incontinence, difficulty with walking, or any weakness.  The patient has no family history of cancer or hematologic disorders. 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    MR thoracic spine w/ A space-occupying process is noted in the left dorsal epidural space centered at T6, measuring approximately 7 x 11 x 30 mm. The lesion is hypointense before gadolinium, and becomes confluent with adjacent epidural fat on the T2 and postgadolinium images. There is lateral extension to the left T5-6 and T6-7 neural foramina, which are mildly narrowed.    Oncology consulted for malignancy work up.      Neurosurgery offering intervention however patient is reluctant to proceed with surgery as she "wants more information first".  She would like to see PET CT results prior to considering surgery.  I discussed with her that we will not be able to adequately treat her if we do not have pathology (if this is a malignancy).  She is concerned that surgery will "cause more inflammation", we discussed that if the mass is malignant there is the risk of growth leading to metastatic disease or worsening neurological side effects.  Patient aware of risks/benefits and mentioned she had a similiar discussion with the neurosurgical team.  She wants more time to discuss with her .     She agreed to additional blood tests as part of an oncologic work up however needs more time regarding biopsy (we mentioned that if she doesnt want surgery then she should still get a biopsy alone).  Discussions ongoing.        #Spinal lesion  -differential includes malignancy vs infectious process, ID following  -PET CT pending & CT AP today   -would check LDH and beta 2 microglobulin, hepatitis (surface ab, ag and core ab), HIV testing  -can check TLS labs including uric acid, LDH and phos  -steroids per neurosurgery, currently on hold for biopsy yield  -further recommendations pending biopsy results     oncology will continue to follow  to be discussed w/ oncologist Dr. Dinesh Vaz

## 2024-08-29 NOTE — PROGRESS NOTE ADULT - SUBJECTIVE AND OBJECTIVE BOX
NSCU Progress Note    Assessment/Hospital Course:    45 yo female with no significant PMH presented to hospital c/o pain in her back in between her shoulder blades that started last Thursday 8/22, says it "feels like a shock" when she takes deep breaths. Pt went to urgent care and was prescribed muscle relaxants with no relief of symptoms. Pt reports pain then increased and new symptoms of b/l LE tingling and numbness to her anterior thighs with left being worse than right started 8/27. Pt stated tingling radiates down anterior aspect of LLE down to ankle and RLE down to knee. Denies saddles anesthesia, bowel/bladder incontinence, difficulty with ambulation or weakness.       24 Hour Events/Subjective:  - admitted      REVIEW OF SYSTEMS:  - negative except as above    VITALS:   - T(C): 36.6 (08-28-24 @ 21:44), Max: 37.8 (08-28-24 @ 15:22)  T(F): 97.8 (08-28-24 @ 21:44), Max: 100.1 (08-28-24 @ 15:22)  HR: 104 (08-28-24 @ 23:00) (90 - 104)  BP: 158/89 (08-28-24 @ 23:00) (120/81 - 158/89)  ABP: --  ABP(mean): --  RR: 18 (08-28-24 @ 23:00) (16 - 18)  SpO2: 100% (08-28-24 @ 23:00) (98% - 100%)      IMAGING/DATA:   - Reviewed          PHYSICAL EXAM:    General: calm  CVS: RRR  Pulm: CTAB  GI: Soft, NTND  Extremities: No LE Edema  Neuro: AOx3, PERRL, EOMI, facial symmetrical, fluent speech, motor 5/5 throughout, no PND, sensation in tact

## 2024-08-29 NOTE — CONSULT NOTE ADULT - ASSESSMENT
43 yo female with no significant PMH presented to hospital c/o back pain x 2 weeks found to have T5-7 epidural lesion. Patient has been stable, afebrile without leukocytosis. MRI findings suggestive of mass. Low suspicion for infection at this time. She had CT C/A/P w IVC today- Mildly enlarged bilateral axillary and right lower abdominal/pelvic lymph nodes of uncertain significance, 4 mm nonspecific lung nodule in the left upper lobe of doubtful significance, Otherwise, no evidence of metastatic disease in the chest, abdomen and pelvis. Heme/onc following for malignancy workup. PET CT pending. Possible OR with NSGY pending patient further discussing with .     Suggest:  -f/u Bcxs 8/28  -continue to hold antibiotics   -f/u PET CT   -f/u OR plan     Team 2 will follow you.    Case d/w primary team.  Final recommendation pending attending note.    Shanta Dawson, Infectious Diseases PA  Please reach out for any questions 9 am-5pm.   For evenings and weekends, please call the ID physician on call.

## 2024-08-29 NOTE — CONSULT NOTE ADULT - NS ATTEND AMEND GEN_ALL_CORE FT
43 yo F with upper back pain since 8/22, paresthesias.  In the ED, she had Tm 100.1 (r), , WBC 6.3 with nl diff, ESR 33, CMP 11.9, MR T spine (w/wo) showed L dorsal epidural mass centered at T6 (7 X 11 X30 mm) with associated mass effect, mod to severe T5-T7 central canal stenoses, c/c lipid rich neoplasm vs. epidural lipomatosis.  ID called overnight, recommended vanc and cefepime, which were not given. She did not feel temp elevation, no rigors, ROS negative except as above.  On exam, she was ~well appearing, normal motor strength.  CT C/A/P that showed a few mildly enlarged mesenteric and retroperitoneal LNs in LLQ including a 1.8 X 1.2 cm R iliac chain LN and 1.6 X 1.3 cm mesenteric LN, and mildly enlarged bilateral axillary LNs.  PET showed uptake in epidural lesion most prominent at T6, as well as FDG avid cervical, supraclavicular, axillary, retroperitoneal, mesenteric and inguinal LA suspicious for metastatic vs. additional sites of disease, hypermetabolic L parotid and diffusely FDG avid thyroid gland suggestive of thyroiditis.  Imp:  more likely malignancy than infection.  W/U per primary team and Heme Onc.  Please send blood cultures X 2 sets.  Can hold off on antibiotics at this time.  Discussed with Dr. Burden.  Will follow with you for now.
45 yo F with no PMH here for leg numbness with T6 spinal cord compression.  Patient denies any B symptoms.  MRI preformed showing cord compression.  CT CAP done showing mild LAD.  PET done showing T6 lesion with SUV max 10, some LAD in axillae and b/l inguinal with a lower SUV.    Given presentation this is c/f malignancy.  Neurosurgery offered intervention, but patient is declining.  From an oncological stand point tissue is needed for further recommendations.  If this is a low grade lymphoma treatment consists of RT to the cord compression site.  If this is a high grade lymphoma chemotherapy is needed.  If this is another type of malignancy, treatment will be according to the pathology.    --please obtain an LDH, b2 microglobulin  --recommend biopsy of SUV most avid lesion if feasible  --lymphoma is very responsive to steroids, so if possible a biopsy before steroids would be preferred to get diagnostic tissue    Discussed with patient and her  at bedside.  Discussed with primary team and Dr.D'Amico, summary being the patient is still considering her options and what path she would like to pursue, but Dr.D'Amico is recommending laminectomy.

## 2024-08-29 NOTE — PROGRESS NOTE ADULT - ASSESSMENT
44F obese admit for MAP goals for spinal perfusion found to have severe narrowing of thoracic spine     -Ncq2,VSq1  -MAP goal >75  -c/w NS @ 75cc/hr   -pain management w/ Tylenol    -NPO; T&S, pregnancy profile, coagulation profile   -bowel regimen

## 2024-08-29 NOTE — PROGRESS NOTE ADULT - ASSESSMENT
44y/F with  mod to severe spinal stenosis  T5-T7 with epidural lesions, r/o epidural lipomatosis  uterine fibroid    PLAN:   NEURO:  REHAB:  physical therapy evaluation and management    EARLY MOB:      PULM:  Room air, incentive spirometry  CARDIO:  SBP goal 100-140mm Hg  ENDO:  Blood sugar goals 140-180 mg/dL, continue insulin sliding scale  GI:  PPI for GI prophylaxis  DIET:  RENAL:    HEM/ONC:  VTE Prophylaxis:     ID: afebrile, no leukocytosis  ====================   T(F): , Max: 100.1 (08-28-24 @ 15:22)    WBC Count: 6.27 (08-28)    ====================  Social: will update family    Active issues:  What's keeping patient in the ICU?  What is this patient's dispo plan?    ATTENDING ATTESTATION:  I was physically present for the key portions of the evaluation and management (E/M) service provided.  I agree with the above history, physical and plan, which I have reviewed and edited where appropriate.    Patient at high risk for neurological deterioration or death due to:  ICU delirium, aspiration PNA, DVT / PE.  Critical care time:  I have personally provided 60 minutes of critical care time, excluding time spent on separate procedures.      Plan discussed with RN, house staff. 44y/F with  mod to severe spinal stenosis  T5-T7 with epidural lesions  uterine fibroid    PLAN:   NEURO: neurochecks q1h, PRN pain meds acetaminophen  CT Chest abd pelvis  REHAB:  physical therapy evaluation and management    EARLY MOB:      PULM:  Room air, incentive spirometry  CARDIO:  MAP >765  ENDO:  Blood sugar goals 140-180 mg/dL, continue insulin sliding scale  GI:  bowel regimen  DIET: regular diet  RENAL:  IVL  HEM/ONC: hem/onc consult  VTE Prophylaxis:  SCDs, SQL tonight if imaging shows no heme and not for OR  ID: afebrile, no leukocytosis  Social: family at bedside    Active issues:  What's keeping patient in the ICU?  What is this patient's dispo plan?    ATTENDING ATTESTATION:  I was physically present for the key portions of the evaluation and management (E/M) service provided.  I agree with the above history, physical and plan, which I have reviewed and edited where appropriate.    Patient at high risk for neurological deterioration or death due to:  ICU delirium, aspiration PNA, DVT / PE.  Critical care time:  I have personally provided 60 minutes of critical care time, excluding time spent on separate procedures.      Plan discussed with RN, house staff.

## 2024-08-29 NOTE — PROGRESS NOTE ADULT - ASSESSMENT
ASSESSMENT/PLAN:    BLE weakness and sensory deficit 2/2  T5-7 epidural lesion    NEURO:  - neuro checks q1h  - defer ccs per nsg for now  - CT CAP refused by family, opting to discuss with nsg prior to further imaging  - possible OR, pending nsg    CVS:  - MAP>75  - autopressing  - ekg, tte    PULM:  - ra  - IS As tolerated  - Aspiration precautions    RENAL:  - Fluids: 75cc/h ns while npo  - daily IOs    GI:  - Diet: npo  - Bowel regimen: miralax, senna    ENDO:   - FS goal 120-180    HEME/ONC:  - SCDs  - Chemoppx: hold for possible intervention    ID:  - monitor for fevers  - esr/crp  - id c/s      Patient is at high risk of neurologic deterioration/death due to:  ***      Time spent: 35 critical care minutes

## 2024-08-30 ENCOUNTER — APPOINTMENT (OUTPATIENT)
Dept: NEUROSURGERY | Facility: HOSPITAL | Age: 44
End: 2024-08-30

## 2024-08-30 ENCOUNTER — RESULT REVIEW (OUTPATIENT)
Age: 44
End: 2024-08-30

## 2024-08-30 ENCOUNTER — TRANSCRIPTION ENCOUNTER (OUTPATIENT)
Age: 44
End: 2024-08-30

## 2024-08-30 LAB
B2 MICROGLOB SERPL-MCNC: 1.6 MG/L — SIGNIFICANT CHANGE UP (ref 0.8–2.2)
CULTURE RESULTS: SIGNIFICANT CHANGE UP
PROT SERPL-MCNC: 7 G/DL — SIGNIFICANT CHANGE UP (ref 6–8.3)
SPECIMEN SOURCE: SIGNIFICANT CHANGE UP

## 2024-08-30 PROCEDURE — 88305 TISSUE EXAM BY PATHOLOGIST: CPT | Mod: 26

## 2024-08-30 PROCEDURE — 99232 SBSQ HOSP IP/OBS MODERATE 35: CPT

## 2024-08-30 PROCEDURE — 38505 NEEDLE BIOPSY LYMPH NODES: CPT | Mod: LT

## 2024-08-30 PROCEDURE — 88173 CYTOPATH EVAL FNA REPORT: CPT | Mod: 26

## 2024-08-30 PROCEDURE — 88333 PATH CONSLTJ SURG CYTO XM 1: CPT | Mod: 26,59

## 2024-08-30 PROCEDURE — 76942 ECHO GUIDE FOR BIOPSY: CPT | Mod: 26

## 2024-08-30 PROCEDURE — 99291 CRITICAL CARE FIRST HOUR: CPT

## 2024-08-30 PROCEDURE — 88342 IMHCHEM/IMCYTCHM 1ST ANTB: CPT | Mod: 26

## 2024-08-30 PROCEDURE — 88341 IMHCHEM/IMCYTCHM EA ADD ANTB: CPT | Mod: 26

## 2024-08-30 RX ORDER — DEXAMETHASONE 0.75 MG
4 TABLET ORAL EVERY 6 HOURS
Refills: 0 | Status: DISCONTINUED | OUTPATIENT
Start: 2024-08-30 | End: 2024-08-30

## 2024-08-30 RX ORDER — DEXAMETHASONE 0.75 MG
4 TABLET ORAL EVERY 6 HOURS
Refills: 0 | Status: DISCONTINUED | OUTPATIENT
Start: 2024-08-30 | End: 2024-08-31

## 2024-08-30 RX ORDER — DEXAMETHASONE 0.75 MG
10 TABLET ORAL ONCE
Refills: 0 | Status: COMPLETED | OUTPATIENT
Start: 2024-08-30 | End: 2024-08-30

## 2024-08-30 RX ORDER — DEXAMETHASONE 0.75 MG
TABLET ORAL
Refills: 0 | Status: DISCONTINUED | OUTPATIENT
Start: 2024-08-30 | End: 2024-08-30

## 2024-08-30 RX ORDER — PANTOPRAZOLE SODIUM 40 MG
40 TABLET, DELAYED RELEASE (ENTERIC COATED) ORAL
Refills: 0 | Status: DISCONTINUED | OUTPATIENT
Start: 2024-08-30 | End: 2024-08-31

## 2024-08-30 RX ADMIN — Medication 102 MILLIGRAM(S): at 11:13

## 2024-08-30 RX ADMIN — ACETAMINOPHEN 1000 MILLIGRAM(S): 325 TABLET ORAL at 21:45

## 2024-08-30 RX ADMIN — Medication 4 MILLIGRAM(S): at 17:21

## 2024-08-30 RX ADMIN — ACETAMINOPHEN 1000 MILLIGRAM(S): 325 TABLET ORAL at 20:47

## 2024-08-30 RX ADMIN — CHLORHEXIDINE GLUCONATE 1 APPLICATION(S): 40 SOLUTION TOPICAL at 06:07

## 2024-08-30 NOTE — PROGRESS NOTE ADULT - SUBJECTIVE AND OBJECTIVE BOX
=================================  NEUROCRITICAL CARE ATTENDING NOTE  =================================    ELOY SOUTH   MRN-[id number]  Summary:  44y/F with no significant PMH presented to hospital c/o pain in her back in between her shoulder blades that started last , says it "feels like a shock" when she takes deep breaths. Pt went to urgent care and was prescribed muscle relaxants with no relief of symptoms. Pt reports pain then increased and new symptoms of b/l LE tingling and numbness to her anterior thighs with left being worse than right started . Pt stated tingling radiates down anterior aspect of LLE down to ankle and RLE down to knee. Denies saddles anesthesia, bowel/bladder incontinence, difficulty with ambulation or weakness.  (28 Aug 2024 23:43)    COURSE IN THE HOSPITAL:   admitted to [facility]   No significant events overnight.    No significant events overnight.     Past Medical History: Uterine fibroid  Allergies:  No Known Allergies  Home meds:     PHYSICAL EXAMINATION  T(C): 37 (24 @ 05:38), Max: 37.2 (24 @ 17:49) HR: 93 (24 @ 07:00) (64 - 94) BP: 127/84 (24 @ 06:00) (113/64 - 147/94) RR: 16 (24 @ 06:00) (14 - 22) SpO2: 100% (24 @ 06:00) (99% - 100%)  NEUROLOGIC EXAMINATION:  Patient is awake, alert, fully oriented, pupils 2-3mm equal and briskly reactive to light, EOMs intact, muscle strength 5/5 on all 4s.  GENERAL: not intubated, not in cardiorespiratory distress  EENT:  anicteric  CARDIOVASCULAR: (+) S1 S2, normal rate and regular rhythm  PULMONARY: clear to auscultation bilaterally  ABDOMEN: soft, nontender with normoactive bowel sounds  EXTREMITIES: no edema  SKIN: no rash    LABS:   CAPILLARY BLOOD GLUCOSE 99 96     (6.27)  11.0 (11.6)  7.41  )-----------( 211      ( 29 Aug 2024 18:09 )             35.0      136  |  105  |  9   ----------------------------<  100<H>  4.1   |  22  |  0.78    Ca    8.8      29 Aug 2024 18:09  Phos  2.9       Mg     2.0         TPro  8.0  /  Alb  4.3  /  TBili  0.5  /  DBili  x   /  AST  20  /  ALT  14  /  AlkPhos  60   @ 07: @ 07:00  --------------------------------------------------------  IN: 975 mL / OUT: 1625 mL / NET: -650 mL     @ 07: @ 07:31  --------------------------------------------------------  IN: 75 mL / OUT: 0 mL / NET: 75 mL    Bacteriology:  CSF studies:  EEG:  [email or web address] 06:11 PM	MR Spine Thx	Epidural lesion, T5-T7 stenoses, consider surgery, correlate for steroid excess  24 02:37 PM	XR Chest 2V	Lungs clear, no infiltrate/effusion/pneumothorax, normal cardiac silhouette, no acute bone abnormality    Other imaging:    MEDICATIONS:     ·	polyethylene glycol 3350 17 Oral daily  ·	acetaminophen     Tablet .. 1000 Oral every 8 hours PRN    IV FLUIDS: NS@100cc/hr  DRIPS:  DIET: regular diet  Lines:  Drains:    Wounds:    CODE STATUS:  Full Code                       GOALS OF CARE:  aggressive                      DISPOSITION:  ICU =================================  NEUROCRITICAL CARE ATTENDING NOTE  =================================    ELOY SOUTH   MRN-[id number]  Summary:  44y/F with no significant PMH presented to hospital c/o pain in her back in between her shoulder blades that started last , says it "feels like a shock" when she takes deep breaths. Pt went to urgent care and was prescribed muscle relaxants with no relief of symptoms. Pt reports pain then increased and new symptoms of b/l LE tingling and numbness to her anterior thighs with left being worse than right started . Pt stated tingling radiates down anterior aspect of LLE down to ankle and RLE down to knee. Denies saddles anesthesia, bowel/bladder incontinence, difficulty with ambulation or weakness.  (28 Aug 2024 23:43)    COURSE IN THE HOSPITAL:   admitted to [facility]   No significant events overnight.    No significant events overnight. IR biopsy (axillary lymph nodes)    Past Medical History: Uterine fibroid  Allergies:  No Known Allergies  Home meds:     PHYSICAL EXAMINATION  T(C): 37 (24 @ 05:38), Max: 37.2 (24 @ 17:49) HR: 93 (24 @ 07:00) (64 - 94) BP: 127/84 (24 @ 06:00) (113/64 - 147/94) RR: 16 (24 @ 06:00) (14 - 22) SpO2: 100% (24 @ 06:00) (99% - 100%)  NEUROLOGIC EXAMINATION:  Patient is awake, alert, fully oriented, pupils 2-3mm equal and briskly reactive to light, EOMs intact, muscle strength 5/5 on all 4s, on /off paresthesias T5 and below  GENERAL: not intubated, not in cardiorespiratory distress  EENT:  anicteric  CARDIOVASCULAR: (+) S1 S2, normal rate and regular rhythm  PULMONARY: clear to auscultation bilaterally  ABDOMEN: soft, nontender with normoactive bowel sounds  EXTREMITIES: no edema  SKIN: no rash    LABS:   CAPILLARY BLOOD GLUCOSE 99 96     No labs - will f/u      @ 07: @ 07:00  --------------------------------------------------------  IN: 975 mL / OUT: 1625 mL / NET: -650 mL     @ 07: @ 07:31  --------------------------------------------------------  IN: 75 mL / OUT: 0 mL / NET: 75 mL    Bacteriology:  CSF studies:  EEG:  [email or web address] 06:11 PM	MR Spine Thx	Epidural lesion, T5-T7 stenoses, consider surgery, correlate for steroid excess  24 02:37 PM	XR Chest 2V	Lungs clear, no infiltrate/effusion/pneumothorax, normal cardiac silhouette, no acute bone abnormality    Other imaging:    MEDICATIONS:     ·	polyethylene glycol 3350 17 Oral daily  ·	acetaminophen     Tablet .. 1000 Oral every 8 hours PRN    IV FLUIDS: NS@75cc/hr  DRIPS:  DIET: NPO   Lines:  Drains:    Wounds:    CODE STATUS:  Full Code                       GOALS OF CARE:  aggressive                      DISPOSITION:  ICU

## 2024-08-30 NOTE — DIETITIAN INITIAL EVALUATION ADULT - OTHER CALCULATIONS
Pt is >100% ideal body weight, thus ideal body weight used for all calculations. Needs adjusted for age, clinical condition, ICU status (not vented).

## 2024-08-30 NOTE — DIETITIAN INITIAL EVALUATION ADULT - PERTINENT LABORATORY DATA
08-29    136  |  105  |  9   ----------------------------<  100<H>  4.1   |  22  |  0.78    Ca    8.8      29 Aug 2024 18:09  Phos  2.9     08-29  Mg     2.0     08-29    TPro  7.0  /  Alb  x   /  TBili  x   /  DBili  x   /  AST  x   /  ALT  x   /  AlkPhos  x   08-29  POCT Blood Glucose.: 99 mg/dL (08-29-24 @ 17:00)  A1C with Estimated Average Glucose Result: 5.1 % (08-29-24 @ 18:09)

## 2024-08-30 NOTE — PROGRESS NOTE ADULT - ASSESSMENT
44y/F with  mod to severe spinal stenosis  T5-T7 with epidural lesions  uterine fibroid    PLAN:   NEURO: neurochecks q1h, PRN pain meds acetaminophen  CT Chest abd pelvis  REHAB:  physical therapy evaluation and management    EARLY MOB:      PULM:  Room air, incentive spirometry  CARDIO:  MAP >765  ENDO:  Blood sugar goals 140-180 mg/dL, continue insulin sliding scale  GI:  bowel regimen  DIET: regular diet  RENAL:  IVL  HEM/ONC: hem/onc consult  VTE Prophylaxis:  SCDs, SQL tonight if imaging shows no heme and not for OR  ID: afebrile, no leukocytosis  Social: family at bedside    Active issues:  What's keeping patient in the ICU?  What is this patient's dispo plan?    ATTENDING ATTESTATION:  I was physically present for the key portions of the evaluation and management (E/M) service provided.  I agree with the above history, physical and plan, which I have reviewed and edited where appropriate.    Patient at high risk for neurological deterioration or death due to:  ICU delirium, aspiration PNA, DVT / PE.  Critical care time:  I have personally provided 60 minutes of critical care time, excluding time spent on separate procedures.      Plan discussed with RN, house staff. 44y/F with  mod to severe spinal stenosis  T5-T7 with epidural lesions  uterine fibroid    PLAN:   NEURO: spinal checks q2h, VS q2h, PRN pain meds acetaminophen  for IR biopsy of axillary lesions; f/u neurosurgical plans  CT Chest abd pelvis done   REHAB:  physical therapy evaluation and management    EARLY MOB:  OOB to chair    PULM:  Room air, incentive spirometry  CARDIO:  MAP >65  ENDO:  Blood sugar goals 140-180 mg/dL, continue insulin sliding scale  GI:  bowel regimen  DIET: NPO  RENAL:  IVF while NPO  HEM/ONC: hem/onc consult / PET scan  VTE Prophylaxis:  SCDs, restart SQL once cleared by interventional radiology   ID: afebrile, no leukocytosis  Social: family at bedside    Active issues:  What's keeping patient in the ICU?  What is this patient's dispo plan?    ATTENDING ATTESTATION:  I was physically present for the key portions of the evaluation and management (E/M) service provided.  I agree with the above history, physical and plan, which I have reviewed and edited where appropriate.    Patient at high risk for neurological deterioration or death due to:  ICU delirium, aspiration PNA, DVT / PE.  Critical care time:  I have personally provided 60 minutes of critical care time, excluding time spent on separate procedures.      Plan discussed with RN, house staff.

## 2024-08-30 NOTE — DIETITIAN INITIAL EVALUATION ADULT - PERSON TAUGHT/METHOD
Pt amenable to education; RD provided education in regards to the importance of adequate macro and micronutrients, as well as hydration to support ADLs, maintain energy levels and overall functional/nutritional status. General healthful education provided. Nutrient-dense foods promoted for when pt's diet is reinitiated. Pt appeared receptive and verbalized understanding./verbal instruction/patient instructed

## 2024-08-30 NOTE — DIETITIAN NUTRITION RISK NOTIFICATION - ADDITIONAL COMMENTS/DIETITIAN RECOMMENDATIONS
Nutrition Recommendations:  1. NPO  **as medically feasible, reinitiate regular diet**  **provide additional nourishments and/or oral nutrition supplements per pt preferences and if pt's PO intakes are consistently <50%**  2. Encourage pt to meet nutritional needs as able  3. Monitor PO intakes, trend weights (weekly), monitor skin integrity, monitor labs (electrolytes, CMP), monitor GI function  4. Encourage adherence to diet education (reinforce as able)  5. Pain and bowel regimen per team   6. Will continue to assess/honor preferences as able   7. Align nutrition interventions with goals of care at all times

## 2024-08-30 NOTE — PROGRESS NOTE ADULT - SUBJECTIVE AND OBJECTIVE BOX
INFECTIOUS DISEASES CONSULT FOLLOW-UP NOTE    INTERVAL HPI/OVERNIGHT EVENTS:    Patient seen and examined at bedside. MARIMAR. Afebrile. Went for IR aspiration of axillary lymph node this AM.      ROS:   Constitutional, eyes, ENT, cardiovascular, respiratory, gastrointestinal, genitourinary, integumentary, neurological, psychiatric and heme/lymph are otherwise negative other than noted above       ANTIBIOTICS/RELEVANT:    MEDICATIONS  (STANDING):  chlorhexidine 2% Cloths 1 Application(s) Topical <User Schedule>  dexAMETHasone     Tablet 4 milliGRAM(s) Oral every 6 hours  polyethylene glycol 3350 17 Gram(s) Oral daily  sodium chloride 0.9%. 1000 milliLiter(s) (75 mL/Hr) IV Continuous <Continuous>    MEDICATIONS  (PRN):  acetaminophen     Tablet .. 1000 milliGRAM(s) Oral every 8 hours PRN Mild Pain (1 - 3)        Vital Signs Last 24 Hrs  T(C): 36.8 (30 Aug 2024 14:36), Max: 37.2 (29 Aug 2024 17:49)  T(F): 98.2 (30 Aug 2024 14:36), Max: 99 (29 Aug 2024 22:28)  HR: 82 (30 Aug 2024 14:31) (64 - 94)  BP: 127/74 (30 Aug 2024 14:31) (113/64 - 146/84)  BP(mean): 95 (30 Aug 2024 14:31) (77 - 108)  RR: 18 (30 Aug 2024 14:31) (14 - 22)  SpO2: 100% (30 Aug 2024 14:31) (99% - 100%)    Parameters below as of 30 Aug 2024 14:31  Patient On (Oxygen Delivery Method): room air        08-29-24 @ 07:01 - 08-30-24 @ 07:00  --------------------------------------------------------  IN: 975 mL / OUT: 1625 mL / NET: -650 mL    08-30-24 @ 07:01  -  08-30-24 @ 15:46  --------------------------------------------------------  IN: 350 mL / OUT: 300 mL / NET: 50 mL      PHYSICAL EXAM:  Constitutional: alert, NAD  Eyes: the sclera and conjunctiva were normal.   ENT: the ears and nose were normal in appearance.   Neck: the appearance of the neck was normal and the neck was supple.   Pulmonary: no respiratory distress and lungs were clear to auscultation bilaterally.   Heart: heart rate was normal and rhythm regular, normal S1 and S2  Vascular:. there was no peripheral edema  Abdomen: normal bowel sounds, soft, non-tender  MSK: no spinal or paraspinal tenderness of c/t/l spine.   Neurological: no focal deficits. 5/5 strength b/l LE. B/l LE sensation grossly intact.   Psychiatric: the affect was normal      LABS:                        11.0   7.41  )-----------( 211      ( 29 Aug 2024 18:09 )             35.0     08-29    136  |  105  |  9   ----------------------------<  100<H>  4.1   |  22  |  0.78    Ca    8.8      29 Aug 2024 18:09  Phos  2.9     08-29  Mg     2.0     08-29    TPro  7.0  /  Alb  x   /  TBili  x   /  DBili  x   /  AST  x   /  ALT  x   /  AlkPhos  x   08-29      Urinalysis Basic - ( 29 Aug 2024 18:09 )    Color: x / Appearance: x / SG: x / pH: x  Gluc: 100 mg/dL / Ketone: x  / Bili: x / Urobili: x   Blood: x / Protein: x / Nitrite: x   Leuk Esterase: x / RBC: x / WBC x   Sq Epi: x / Non Sq Epi: x / Bacteria: x        MICROBIOLOGY:    Culture - Blood (collected 08-28-24 @ 20:45)  Source: .Blood Blood-Peripheral  Preliminary Report (08-30-24 @ 03:02):    No growth at 24 hours    Culture - Blood (collected 08-28-24 @ 20:40)  Source: .Blood Blood-Peripheral  Preliminary Report (08-30-24 @ 03:02):    No growth at 24 hours    Culture - Urine (collected 08-28-24 @ 16:43)  Source: Clean Catch Clean Catch (Midstream)  Final Report (08-30-24 @ 09:26):    <10,000 CFU/mL Normal Urogenital Emely        RADIOLOGY & ADDITIONAL STUDIES:  Reviewed

## 2024-08-30 NOTE — DIETITIAN INITIAL EVALUATION ADULT - OTHER INFO
This is a 44 year old female with no significant PMH presented to hospital complains of pain in her back in between her shoulder blades that started last Thursday 8/22, says it "feels like a shock" when she takes deep breaths. Pt went to urgent care and was prescribed muscle relaxants with no relief of symptoms. Pt reports pain then increased and new symptoms of b/l LE tingling and numbness to her anterior thighs with left being worse than right started 8/27. Pt stated tingling radiates down anterior aspect of LLE down to ankle and RLE down to knee.    Pt seen in room for nutrition assessment. Pt on 8 east, family at bedside; pt noted with no gtts/drips, no pressor support, MAPs ranging from , pt on room air, SpO2 %. Pt afebrile. Pt reports fair/good appetite PTA and during hospital stay. As per diet recall PTA: pt noted with good overall PO intakes, eats various foods, lean protein sources (meat/fish/legumes/plant based proteins/eggs/low fat dairy products), whole grains/starches, fruits, vegetables, healthy fats/oils, fun foods, etc. Pt currently NPO status, pt was briefly on regular diet x <1 day, RD to follow up with medical team. No cultural, Ep[employer], or ethnic food preferences noted. No known food allergies. Denies major/significant wt changes, reports wt stability at current wt. Dosing wt: ~312 pounds, Ideal body weight: 145 pounds, pt is ~215% of ideal body weight. Denies major GI upset such as nausea, vomiting, diarrhea, constipation, last BM not documented, pt is on a bowel regimen. No edema noted. Skin: intact, no pressure ulcers noted, no incisions noted. [place]: 21. No issues chewing or swallowing noted. Denies pain. Labs reviewed: elevated serum Glucose (100); RD to continue to monitor trends. Nutritionally pertinent medications/supplements: IV fluids, MIRALAX. Observed pt with no overt signs of muscle or fat wasting. Based on ASPEN guidelines, pt does not meet criteria for malnutrition at this time. Pt amenable to education; RD provided education in regards to the importance of adequate macro and micronutrients, as well as hydration to support ADLs, maintain energy levels and overall functional/nutritional status. General healthful education provided. Nutrient-dense foods promoted for when pt's diet is reinitiated. Pt appeared receptive and verbalized understanding. No additional nutrition-related concerns. Will continue to follow. Additional nutrition recommendations below to follow.

## 2024-08-30 NOTE — PATIENT PROFILE ADULT - FALL HARM RISK - HARM RISK INTERVENTIONS

## 2024-08-30 NOTE — DIETITIAN INITIAL EVALUATION ADULT - ADD RECOMMEND
1. NPO  **as medically feasible, reinitiate regular diet**  **provide additional nourishments and/or oral nutrition supplements per pt preferences and if pt's PO intakes are consistently <50%**  2. Encourage pt to meet nutritional needs as able  3. Monitor PO intakes, trend weights (weekly), monitor skin integrity, monitor labs (electrolytes, CMP), monitor GI function  4. Encourage adherence to diet education (reinforce as able)  5. Pain and bowel regimen per team   6. Will continue to assess/honor preferences as able   7. Align nutrition interventions with goals of care at all times

## 2024-08-30 NOTE — PROGRESS NOTE ADULT - NS ATTEND AMEND GEN_ALL_CORE FT
Likely malignancy, s/p LN biopsy.  Please recall if evidence for infection or if further ID input is desired - team 2.

## 2024-08-30 NOTE — CHART NOTE - NSCHARTNOTEFT_GEN_A_CORE
45 y/o female with no significant PMHx who presented 8/28/24 with pain in between her shoulder blades that started 8/22/24 and b/l LE tingling and numbness to her anterior thighs with left being worse than right that started 8/27/24 and was found to have T5-T7 lesion on MRI. Radiation oncology was contacted to meet patient and discuss potential role of radiation therapy pending dx of malignancy.    In brief:  - 8/28/24 pt presented to [facility] with back in between her shoulder blades that started 8/22/24 and b/l LE tingling and numbness to her anterior thighs with left being worse than right that started 8/27/24.   - 8/28/24 MRI thoracic: space-occupying process is noted in the left dorsal epidural space centered at T6, measuring approximately 7 x 11 x 30 mm  - 8/29/24 PET: focal FDG uptake at T5-T7 correlation w/ tissue sampling may be considered. FDG cervical, supralcavicular, axillary,retroperitoneal mesenteric, and inguinal lymphadenopathy suspicious for metastatic, hypermetabolic left parotid nodule   - Seen by heme/onc   - 8/30/24 S/P Axillary lymph node biopsy 8/30/24; seen by heme onc who discussed the need for possible second spinal lesion biopsy. Path pending at time of consult.    Patient doing well, sitting comfortably in bed with pain controlled medically.      Imaging reviewed.    Exam wnl.      A/P:  Patient with new spine lesion suspicious for potential malignancy but with path pending. Histology if malignancy will determine optimal care pathway, with resection +/- RT vs RT alone vs systemic therapy and observation all being options. We discussed RT and answered all patient questions. We will await reconsultation pending path result.    Mike Whitman  Rad Onc    I spent 70 minutes assessing and evaluating the patient.

## 2024-08-30 NOTE — PROGRESS NOTE ADULT - ASSESSMENT
43 yo female with no significant PMH presented to hospital c/o back pain x 2 weeks found to have T5-7 epidural lesion. Patient has been stable, afebrile without leukocytosis. MRI findings suggestive of mass. Low suspicion for infection at this time. She had CT C/A/P w IVC today- Mildly enlarged bilateral axillary and right lower abdominal/pelvic lymph nodes of uncertain significance, 4 mm nonspecific lung nodule in the left upper lobe of doubtful significance, Otherwise, no evidence of metastatic disease in the chest, abdomen and pelvis. Heme/onc following for malignancy workup. PET CT: uptake in epidural lesion most prominent at T6, as well as FDG avid cervical, supraclavicular, axillary, retroperitoneal, mesenteric and inguinal LA suspicious for metastatic vs. additional sites of disease, hypermetabolic L parotid and diffusely FDG avid thyroid gland suggestive of thyroiditis --suggesting malignancy > infection. S/p IR biopsy of axillary lymph node. Pt has decided against surgical intervention.     Suggest:  -f/u Bcxs 8/28 --ngtd   -f/u IR bx   -continue to hold antibiotics     Team 2 will sign off. Thank you for your consultation   Please recall if further ID input is needed or findings c/f infection found on pathology  Case d/w primary team.  Final recommendation pending attending note.    Shanta Dawson, Infectious Diseases PA  Please reach out for any questions 9 am-5pm.   For evenings and weekends, please call the ID physician on call.

## 2024-08-30 NOTE — PROGRESS NOTE ADULT - ASSESSMENT
44 year old female with no known prior past medical history who presented ot the hospital with back pain and lower extremity numbness, found to have space occupying process in left dorsal epidural space at T6, narrowing of thoracic spine, as well as cervical, supraclavicular, axillary, retroperitoneal, mesenteric and inguinal lymphadenopathy as well parathyroid nodule.     #T6 epidural space occupying process  #Lymphadenopathy   - PET scan 1.  Corresponding to epidural lesion described on MRI of the thoracic  spine from 8/20/2024, there is focal FDG uptake, SUV max 10.0, within the   central canal at T5-T7, most prominent at the T6 level. Correlation with   tissue sampling may be considered.  2.  FDG avid cervical, supraclavicular, axillary, retroperitoneal,   mesenteric, and inguinal lymphadenopathy, suspicious for metastatic   disease/additional sites of disease. If clinically indicated,   alternatively a hypermetabolic 1.6 cm right external iliac node, SUV max   9.0 (series 4 image 177) may be considered for tissue sampling.  3.  Hypermetabolic left parotid 0.5 cm, SUV max 5.1, hyperdense nodule.   This could represent pathologic intraparotid lymph node although primary   parotid neoplasm, such as pleomorphic adenoma, cannot be excluded.  4.Diffusely FDG avid thyroid gland suggestive of thyroiditis. Recommend   correlation with TSH levels.  - s/p axillary lymph node biopsy 8/30/24 - discussed that this may lead to a diagnosis of lower grade lymphoma, for which a secondary biopsy of spinal lesion may be needed. Patient to be started on steroids by NSGY team. Patient hesitant to pursue spinal lesion at this time   - patient will need OP follow up for biopsy results; disposition pending NSGY team     Discussed with attending physician, Dr. Dinesh Vaz

## 2024-08-30 NOTE — PROGRESS NOTE ADULT - SUBJECTIVE AND OBJECTIVE BOX
HPI:  43 yo female with no significant PMH presented to hospital c/o pain in her back in between her shoulder blades that started last Thursday 8/22, says it "feels like a shock" when she takes deep breaths. Pt went to urgent care and was prescribed muscle relaxants with no relief of symptoms. Pt reports pain then increased and new symptoms of b/l LE tingling and numbness to her anterior thighs with left being worse than right started 8/27. Pt stated tingling radiates down anterior aspect of LLE down to ankle and RLE down to knee. Denies saddles anesthesia, bowel/bladder incontinence, difficulty with ambulation or weakness.         S/Overnight events: No events overnight.     Hospital Course:   8/28: Admitted to NSICU.   8/29: MARIMAR o/n, neuro stable, CT CAP and PET Scan done. Plan for IR biopsy in AM. NPO at midnight, IVF while NPO.     Vital Signs Last 24 Hrs  T(C): 36.9 (30 Aug 2024 01:06), Max: 37.2 (29 Aug 2024 17:49)  T(F): 98.5 (30 Aug 2024 01:06), Max: 99 (29 Aug 2024 22:28)  HR: 66 (30 Aug 2024 01:00) (66 - 94)  BP: 113/64 (30 Aug 2024 01:00) (113/64 - 147/94)  BP(mean): 82 (30 Aug 2024 01:00) (77 - 115)  RR: 18 (30 Aug 2024 01:00) (14 - 22)  SpO2: 100% (30 Aug 2024 01:00) (98% - 100%)    Parameters below as of 29 Aug 2024 19:00  Patient On (Oxygen Delivery Method): room air    I&O's Detail    28 Aug 2024 07:01  -  29 Aug 2024 07:00  --------------------------------------------------------  IN:    sodium chloride 0.9%: 300 mL    sodium chloride 0.9%: 600 mL  Total IN: 900 mL    OUT:    Voided (mL): 1125 mL  Total OUT: 1125 mL    Total NET: -225 mL      29 Aug 2024 07:01  -  30 Aug 2024 02:29  --------------------------------------------------------  IN:    sodium chloride 0.9%: 300 mL    sodium chloride 0.9%: 300 mL  Total IN: 600 mL    OUT:    Voided (mL): 1025 mL  Total OUT: 1025 mL    Total NET: -425 mL    I&O's Summary    28 Aug 2024 07:01  -  29 Aug 2024 07:00  --------------------------------------------------------  IN: 900 mL / OUT: 1125 mL / NET: -225 mL    29 Aug 2024 07:01  -  30 Aug 2024 02:29  --------------------------------------------------------  IN: 600 mL / OUT: 1025 mL / NET: -425 mL    PHYSICAL EXAM:  Constitutional: NAD, resting comfortably in bed.   HEENT: Pupils equal, round, reactive to light. EOMI. Face symmetric, tongue midline.  Respiratory: No respiratory distress, lungs clear to auscultation bilaterally.   Cardiovascular: RRR, S1, S2.   Gastrointestinal: Abdomen soft, non tender, nondistended.  Neurological:  AAOX3. Opens eyes. Follows commands. Speech clear.  Cranial Nerves: II-XII intact  Motor: 5/5 power in b/l UE and LE  Sensation: intact to light touch in all extremities  Pronator Drift: none  Extremities: Warm, well perfused.  Wounds: none    TUBES/LINES:  [] CVC  [] A-line  [] Lumbar Drain  [] Ventriculostomy  [] Rodriguez  [] Other    DIET:  [x] NPO  [] Mechanical  [] Tube feeds    LABS:                        11.0   7.41  )-----------( 211      ( 29 Aug 2024 18:09 )             35.0     08-29    136  |  105  |  9   ----------------------------<  100<H>  4.1   |  22  |  0.78    Ca    8.8      29 Aug 2024 18:09  Phos  2.9     08-29  Mg     2.0     08-29    TPro  8.0  /  Alb  4.3  /  TBili  0.5  /  DBili  x   /  AST  20  /  ALT  14  /  AlkPhos  60  08-28    PT/INR - ( 28 Aug 2024 15:01 )   PT: 11.5 sec;   INR: 1.01            Urinalysis Basic - ( 29 Aug 2024 18:09 )    Color: x / Appearance: x / SG: x / pH: x  Gluc: 100 mg/dL / Ketone: x  / Bili: x / Urobili: x   Blood: x / Protein: x / Nitrite: x   Leuk Esterase: x / RBC: x / WBC x   Sq Epi: x / Non Sq Epi: x / Bacteria: x          CAPILLARY BLOOD GLUCOSE      POCT Blood Glucose.: 99 mg/dL (29 Aug 2024 17:00)  POCT Blood Glucose.: 96 mg/dL (29 Aug 2024 11:31)  POCT Blood Glucose.: 93 mg/dL (29 Aug 2024 06:05)      Drug Levels: [] N/A    CSF Analysis: [] N/A      Allergies    No Known Allergies    Intolerances      MEDICATIONS:  Antibiotics:    Neuro:  acetaminophen     Tablet .. 1000 milliGRAM(s) Oral every 8 hours PRN    Anticoagulation:    OTHER:  chlorhexidine 2% Cloths 1 Application(s) Topical <User Schedule>  polyethylene glycol 3350 17 Gram(s) Oral daily    IVF:  sodium chloride 0.9%. 1000 milliLiter(s) IV Continuous <Continuous>    CULTURES:    RADIOLOGY & ADDITIONAL TESTS:      ASSESSMENT:  43 yo female with no significant PMH presented to hospital c/o pain in her back in between her shoulder blades that started last Thursday 8/22, says it "feels like a shock" when she takes deep breaths. Pt went to urgent care and was prescribed muscle relaxants with no relief of symptoms. Pt reports pain then increased and new symptoms of b/l LE tingling and numbness to her anterior thighs with left being worse than right started 8/27. Pt stated tingling radiates down anterior aspect of LLE down to ankle and RLE down to knee. Denies saddles anesthesia, bowel/bladder incontinence, difficulty with ambulation or weakness.       Neuro:  - neuro/vitals q2  - pain control  - MR T spine: space-occupying process is noted in the left dorsal epidural space centered at T6, measuring approximately 7 x 11 x 30 mm  - CT CAP: mildly enlarged B/L axillary and Rt abd/pelvix LN. 4mm nonspecific lung nodule in the left upper lobe  - PET: focal FDG uptake at T5-T7 correlation w/ tissue sampling may be considered. FDG cervical, supralcavicular, axillary,retroperitoneal mesenteric, and inguinal lymphadenopathy suspicious for metastatic, hypermetabolic left parotid nodule could represent primary parotid neoplasm. Diffuse FDG thyroiditis    CV:  - SBP <160, MAP >65    Resp:  - RA    GI:  - Regular, NPO at midnght   - bowel reg    Renal:  - IVF after midnight  - voiding    Endo:  - A1c 5.1    Heme:  - SCDs  - held SQL ppx  - plan for IR biopsy of axillary LN in AM    ID:  - afebrile    Dispo: NSICU, pending OR    D/w Dr. D'Amico, Dr. Lovelace

## 2024-08-30 NOTE — PROGRESS NOTE ADULT - SUBJECTIVE AND OBJECTIVE BOX
Hematology Oncology Progress Note    Interval History:    SUBJECTIVE: Patient seen and examined at bedside.  Underwent axillary mass biopsy    OBJECTIVE:    VITAL SIGNS:  ICU Vital Signs Last 24 Hrs  T(C): 37 (30 Aug 2024 10:09), Max: 37.2 (29 Aug 2024 17:49)  T(F): 98.6 (30 Aug 2024 10:09), Max: 99 (29 Aug 2024 22:28)  HR: 85 (30 Aug 2024 11:07) (64 - 94)  BP: 138/89 (30 Aug 2024 11:07) (113/64 - 147/94)  BP(mean): 108 (30 Aug 2024 11:07) (77 - 115)  ABP: --  ABP(mean): --  RR: 18 (30 Aug 2024 11:07) (14 - 22)  SpO2: 100% (30 Aug 2024 11:07) (99% - 100%)    O2 Parameters below as of 30 Aug 2024 11:07  Patient On (Oxygen Delivery Method): room air              08-29 @ 07:01 - 08-30 @ 07:00  --------------------------------------------------------  IN: 975 mL / OUT: 1625 mL / NET: -650 mL    08-30 @ 07:01  -  08-30 @ 11:57  --------------------------------------------------------  IN: 350 mL / OUT: 300 mL / NET: 50 mL      CAPILLARY BLOOD GLUCOSE      POCT Blood Glucose.: 99 mg/dL (29 Aug 2024 17:00)      PHYSICAL EXAM:  Physical Exam: Constitutional: Pt found in bed in NAD   ENT: PERRL, EOMi  Respiratory: breathing non-labored, symmetrical chest wall movement  Cardiovascuar: RRR, no murmurs  Gastrointestinal: abdomen soft, non tender  Extr: well  perfused  Skin: dry, warm   Neurological:  AAOX3. Verbal function intact  Cranial Nerves: II-XII intact  Motor: 5/5 power in b/l UE and LE  Sensation: intact to light touch in all extremities, endorses slight tingling in BLLE up to mid torso :     MEDICATIONS:  MEDICATIONS  (STANDING):  chlorhexidine 2% Cloths 1 Application(s) Topical <User Schedule>  dexAMETHasone     Tablet   Oral   polyethylene glycol 3350 17 Gram(s) Oral daily  sodium chloride 0.9%. 1000 milliLiter(s) (75 mL/Hr) IV Continuous <Continuous>    MEDICATIONS  (PRN):  acetaminophen     Tablet .. 1000 milliGRAM(s) Oral every 8 hours PRN Mild Pain (1 - 3)      ALLERGIES:  Allergies    No Known Allergies    Intolerances        LABS:                        11.0   7.41  )-----------( 211      ( 29 Aug 2024 18:09 )             35.0     08-29    136  |  105  |  9   ----------------------------<  100<H>  4.1   |  22  |  0.78    Ca    8.8      29 Aug 2024 18:09  Phos  2.9     08-29  Mg     2.0     08-29    TPro  7.0  /  Alb  x   /  TBili  x   /  DBili  x   /  AST  x   /  ALT  x   /  AlkPhos  x   08-29    PT/INR - ( 28 Aug 2024 15:01 )   PT: 11.5 sec;   INR: 1.01            Urinalysis Basic - ( 29 Aug 2024 18:09 )    Color: x / Appearance: x / SG: x / pH: x  Gluc: 100 mg/dL / Ketone: x  / Bili: x / Urobili: x   Blood: x / Protein: x / Nitrite: x   Leuk Esterase: x / RBC: x / WBC x   Sq Epi: x / Non Sq Epi: x / Bacteria: x            Culture - Blood (collected 08-28-24 @ 20:45)  Source: .Blood Blood-Peripheral  Preliminary Report (08-30-24 @ 03:02):    No growth at 24 hours    Culture - Blood (collected 08-28-24 @ 20:40)  Source: .Blood Blood-Peripheral  Preliminary Report (08-30-24 @ 03:02):    No growth at 24 hours            RADIOLOGY & ADDITIONAL TESTS: Reviewed.

## 2024-08-30 NOTE — DIETITIAN INITIAL EVALUATION ADULT - PERTINENT MEDS FT
MEDICATIONS  (STANDING):  chlorhexidine 2% Cloths 1 Application(s) Topical <User Schedule>  dexAMETHasone     Tablet 4 milliGRAM(s) Oral every 6 hours  dexAMETHasone     Tablet   Oral   polyethylene glycol 3350 17 Gram(s) Oral daily  sodium chloride 0.9%. 1000 milliLiter(s) (75 mL/Hr) IV Continuous <Continuous>    MEDICATIONS  (PRN):  acetaminophen     Tablet .. 1000 milliGRAM(s) Oral every 8 hours PRN Mild Pain (1 - 3)

## 2024-08-31 ENCOUNTER — TRANSCRIPTION ENCOUNTER (OUTPATIENT)
Age: 44
End: 2024-08-31

## 2024-08-31 VITALS
SYSTOLIC BLOOD PRESSURE: 118 MMHG | OXYGEN SATURATION: 98 % | HEART RATE: 92 BPM | DIASTOLIC BLOOD PRESSURE: 70 MMHG | RESPIRATION RATE: 18 BRPM

## 2024-08-31 LAB
% ALBUMIN: 56 % — SIGNIFICANT CHANGE UP
% ALPHA 1: 4.8 % — SIGNIFICANT CHANGE UP
% ALPHA 2: 12.5 % — SIGNIFICANT CHANGE UP
% BETA: 12.1 % — SIGNIFICANT CHANGE UP
% GAMMA: 14.6 % — SIGNIFICANT CHANGE UP
ALBUMIN SERPL ELPH-MCNC: 3.9 G/DL — SIGNIFICANT CHANGE UP (ref 3.6–5.5)
ALBUMIN/GLOB SERPL ELPH: 1.3 RATIO — SIGNIFICANT CHANGE UP
ALPHA1 GLOB SERPL ELPH-MCNC: 0.3 G/DL — SIGNIFICANT CHANGE UP (ref 0.1–0.4)
ALPHA2 GLOB SERPL ELPH-MCNC: 0.9 G/DL — SIGNIFICANT CHANGE UP (ref 0.5–1)
ANION GAP SERPL CALC-SCNC: 11 MMOL/L — SIGNIFICANT CHANGE UP (ref 5–17)
B-GLOBULIN SERPL ELPH-MCNC: 0.8 G/DL — SIGNIFICANT CHANGE UP (ref 0.5–1)
BUN SERPL-MCNC: 13 MG/DL — SIGNIFICANT CHANGE UP (ref 7–23)
CALCIUM SERPL-MCNC: 9.3 MG/DL — SIGNIFICANT CHANGE UP (ref 8.4–10.5)
CHLORIDE SERPL-SCNC: 104 MMOL/L — SIGNIFICANT CHANGE UP (ref 96–108)
CO2 SERPL-SCNC: 22 MMOL/L — SIGNIFICANT CHANGE UP (ref 22–31)
CREAT SERPL-MCNC: 0.71 MG/DL — SIGNIFICANT CHANGE UP (ref 0.5–1.3)
EGFR: 109 ML/MIN/1.73M2 — SIGNIFICANT CHANGE UP
GAMMA GLOBULIN: 1 G/DL — SIGNIFICANT CHANGE UP (ref 0.6–1.6)
GLUCOSE SERPL-MCNC: 134 MG/DL — HIGH (ref 70–99)
HCT VFR BLD CALC: 37.9 % — SIGNIFICANT CHANGE UP (ref 34.5–45)
HGB BLD-MCNC: 12.1 G/DL — SIGNIFICANT CHANGE UP (ref 11.5–15.5)
MAGNESIUM SERPL-MCNC: 2.2 MG/DL — SIGNIFICANT CHANGE UP (ref 1.6–2.6)
MCHC RBC-ENTMCNC: 25.1 PG — LOW (ref 27–34)
MCHC RBC-ENTMCNC: 31.9 GM/DL — LOW (ref 32–36)
MCV RBC AUTO: 78.6 FL — LOW (ref 80–100)
NRBC # BLD: 0 /100 WBCS — SIGNIFICANT CHANGE UP (ref 0–0)
PHOSPHATE SERPL-MCNC: 3.3 MG/DL — SIGNIFICANT CHANGE UP (ref 2.5–4.5)
PLATELET # BLD AUTO: 244 K/UL — SIGNIFICANT CHANGE UP (ref 150–400)
POTASSIUM SERPL-MCNC: 4.2 MMOL/L — SIGNIFICANT CHANGE UP (ref 3.5–5.3)
POTASSIUM SERPL-SCNC: 4.2 MMOL/L — SIGNIFICANT CHANGE UP (ref 3.5–5.3)
PROT PATTERN SERPL ELPH-IMP: SIGNIFICANT CHANGE UP
PROT SERPL-MCNC: 7 G/DL — SIGNIFICANT CHANGE UP (ref 6–8.3)
RBC # BLD: 4.82 M/UL — SIGNIFICANT CHANGE UP (ref 3.8–5.2)
RBC # FLD: 15.9 % — HIGH (ref 10.3–14.5)
SODIUM SERPL-SCNC: 137 MMOL/L — SIGNIFICANT CHANGE UP (ref 135–145)
WBC # BLD: 10.69 K/UL — HIGH (ref 3.8–10.5)
WBC # FLD AUTO: 10.69 K/UL — HIGH (ref 3.8–10.5)

## 2024-08-31 PROCEDURE — 38505 NEEDLE BIOPSY LYMPH NODES: CPT

## 2024-08-31 PROCEDURE — 74177 CT ABD & PELVIS W/CONTRAST: CPT | Mod: MC

## 2024-08-31 PROCEDURE — 88360 TUMOR IMMUNOHISTOCHEM/MANUAL: CPT

## 2024-08-31 PROCEDURE — 71260 CT THORAX DX C+: CPT | Mod: MC

## 2024-08-31 PROCEDURE — 76942 ECHO GUIDE FOR BIOPSY: CPT

## 2024-08-31 PROCEDURE — 36415 COLL VENOUS BLD VENIPUNCTURE: CPT

## 2024-08-31 PROCEDURE — 86900 BLOOD TYPING SEROLOGIC ABO: CPT

## 2024-08-31 PROCEDURE — 88341 IMHCHEM/IMCYTCHM EA ADD ANTB: CPT

## 2024-08-31 PROCEDURE — 86705 HEP B CORE ANTIBODY IGM: CPT

## 2024-08-31 PROCEDURE — A9552: CPT

## 2024-08-31 PROCEDURE — 86803 HEPATITIS C AB TEST: CPT

## 2024-08-31 PROCEDURE — 85652 RBC SED RATE AUTOMATED: CPT

## 2024-08-31 PROCEDURE — 85610 PROTHROMBIN TIME: CPT

## 2024-08-31 PROCEDURE — 99285 EMERGENCY DEPT VISIT HI MDM: CPT | Mod: 25

## 2024-08-31 PROCEDURE — 85025 COMPLETE CBC W/AUTO DIFF WBC: CPT

## 2024-08-31 PROCEDURE — 87340 HEPATITIS B SURFACE AG IA: CPT

## 2024-08-31 PROCEDURE — 78815 PET IMAGE W/CT SKULL-THIGH: CPT | Mod: MC

## 2024-08-31 PROCEDURE — 85379 FIBRIN DEGRADATION QUANT: CPT

## 2024-08-31 PROCEDURE — 84165 PROTEIN E-PHORESIS SERUM: CPT

## 2024-08-31 PROCEDURE — 85027 COMPLETE CBC AUTOMATED: CPT

## 2024-08-31 PROCEDURE — 84550 ASSAY OF BLOOD/URIC ACID: CPT

## 2024-08-31 PROCEDURE — 83880 ASSAY OF NATRIURETIC PEPTIDE: CPT

## 2024-08-31 PROCEDURE — 83036 HEMOGLOBIN GLYCOSYLATED A1C: CPT

## 2024-08-31 PROCEDURE — 71046 X-RAY EXAM CHEST 2 VIEWS: CPT

## 2024-08-31 PROCEDURE — 83605 ASSAY OF LACTIC ACID: CPT

## 2024-08-31 PROCEDURE — A9585: CPT

## 2024-08-31 PROCEDURE — 84100 ASSAY OF PHOSPHORUS: CPT

## 2024-08-31 PROCEDURE — 86901 BLOOD TYPING SEROLOGIC RH(D): CPT

## 2024-08-31 PROCEDURE — 96374 THER/PROPH/DIAG INJ IV PUSH: CPT

## 2024-08-31 PROCEDURE — 99222 1ST HOSP IP/OBS MODERATE 55: CPT

## 2024-08-31 PROCEDURE — 80048 BASIC METABOLIC PNL TOTAL CA: CPT

## 2024-08-31 PROCEDURE — 82232 ASSAY OF BETA-2 PROTEIN: CPT

## 2024-08-31 PROCEDURE — 87389 HIV-1 AG W/HIV-1&-2 AB AG IA: CPT

## 2024-08-31 PROCEDURE — 84155 ASSAY OF PROTEIN SERUM: CPT

## 2024-08-31 PROCEDURE — 86706 HEP B SURFACE ANTIBODY: CPT

## 2024-08-31 PROCEDURE — 80053 COMPREHEN METABOLIC PANEL: CPT

## 2024-08-31 PROCEDURE — 86850 RBC ANTIBODY SCREEN: CPT

## 2024-08-31 PROCEDURE — 81001 URINALYSIS AUTO W/SCOPE: CPT

## 2024-08-31 PROCEDURE — 84702 CHORIONIC GONADOTROPIN TEST: CPT

## 2024-08-31 PROCEDURE — 87040 BLOOD CULTURE FOR BACTERIA: CPT

## 2024-08-31 PROCEDURE — 93005 ELECTROCARDIOGRAM TRACING: CPT

## 2024-08-31 PROCEDURE — 88305 TISSUE EXAM BY PATHOLOGIST: CPT

## 2024-08-31 PROCEDURE — 86140 C-REACTIVE PROTEIN: CPT

## 2024-08-31 PROCEDURE — 88173 CYTOPATH EVAL FNA REPORT: CPT

## 2024-08-31 PROCEDURE — 83615 LACTATE (LD) (LDH) ENZYME: CPT

## 2024-08-31 PROCEDURE — 87086 URINE CULTURE/COLONY COUNT: CPT

## 2024-08-31 PROCEDURE — 72157 MRI CHEST SPINE W/O & W/DYE: CPT | Mod: MC

## 2024-08-31 PROCEDURE — 86704 HEP B CORE ANTIBODY TOTAL: CPT

## 2024-08-31 PROCEDURE — 84484 ASSAY OF TROPONIN QUANT: CPT

## 2024-08-31 PROCEDURE — 88333 PATH CONSLTJ SURG CYTO XM 1: CPT

## 2024-08-31 PROCEDURE — 88342 IMHCHEM/IMCYTCHM 1ST ANTB: CPT

## 2024-08-31 PROCEDURE — 86709 HEPATITIS A IGM ANTIBODY: CPT

## 2024-08-31 PROCEDURE — 83735 ASSAY OF MAGNESIUM: CPT

## 2024-08-31 PROCEDURE — 82962 GLUCOSE BLOOD TEST: CPT

## 2024-08-31 PROCEDURE — 99232 SBSQ HOSP IP/OBS MODERATE 35: CPT

## 2024-08-31 RX ORDER — IBUPROFEN 600 MG
1 TABLET ORAL
Qty: 56 | Refills: 0
Start: 2024-08-31 | End: 2024-09-13

## 2024-08-31 RX ORDER — POLYETHYLENE GLYCOL 3350 17 G/17G
17 POWDER, FOR SOLUTION ORAL
Qty: 0 | Refills: 0 | DISCHARGE
Start: 2024-08-31

## 2024-08-31 RX ORDER — PANTOPRAZOLE SODIUM 40 MG
1 TABLET, DELAYED RELEASE (ENTERIC COATED) ORAL
Qty: 30 | Refills: 0
Start: 2024-08-31 | End: 2024-09-29

## 2024-08-31 RX ORDER — PANTOPRAZOLE SODIUM 40 MG
1 TABLET, DELAYED RELEASE (ENTERIC COATED) ORAL
Qty: 52 | Refills: 0
Start: 2024-08-31

## 2024-08-31 RX ORDER — ACETAMINOPHEN 325 MG/1
2 TABLET ORAL
Qty: 0 | Refills: 0 | DISCHARGE
Start: 2024-08-31

## 2024-08-31 RX ORDER — DEXAMETHASONE 0.75 MG
1 TABLET ORAL
Qty: 52 | Refills: 0
Start: 2024-08-31

## 2024-08-31 RX ADMIN — Medication 4 MILLIGRAM(S): at 05:59

## 2024-08-31 RX ADMIN — Medication 40 MILLIGRAM(S): at 05:59

## 2024-08-31 RX ADMIN — Medication 4 MILLIGRAM(S): at 00:52

## 2024-08-31 NOTE — DISCHARGE NOTE NURSING/CASE MANAGEMENT/SOCIAL WORK - PATIENT PORTAL LINK FT
You can access the FollowMyHealth Patient Portal offered by [facility] by registering at the following website: [email or web address]. By joining [facility]’s FollowMyHealth portal, you will also be able to view your health information using other applications (apps) compatible with our system.

## 2024-08-31 NOTE — PROGRESS NOTE ADULT - REASON FOR ADMISSION
T5-7 epidural lesion

## 2024-08-31 NOTE — CONSULT NOTE ADULT - REASON FOR ADMISSION
Regarding:  No Sx now pt is sleeping, concerned about when she was bathing child in tub baby rolled and inhaled  ----- Message from Sadie Tejeda sent at 7/23/2018  8:24 PM CDT -----  Patient Name: Genevieve Rose  Specialist or PCP: Dr. Montero  Pregnant (If Yes, how long?):  Symptoms: No Sx now pt is sleeping, concerned about when she was bathing child in tub baby rolled and inhaled water and coughed   Call Back #:[phone]  Is the patient’s permanent residence located in [place], [place], or a compact State? Yes [facility] [address]  Call Center Account #:[id number]      
T5-7 epidural lesion

## 2024-08-31 NOTE — DISCHARGE NOTE NURSING/CASE MANAGEMENT/SOCIAL WORK - NSDCPEFALRISK_GEN_ALL_CORE
For information on Fall & Injury Prevention, visit: [email or web address] OR  [email or web address] OR  https://www.cdc.gov/steadi/patient.html

## 2024-08-31 NOTE — CONSULT NOTE ADULT - ASSESSMENT
43 yo female with no significant PMH p/w pain in her back in between her shoulder blades that started Thursday 8/22. Pt went to urgent care and was given a muscle relaxants without relief.  Pt reports  that she had b/l LE tingling and numbness to her anterior thighs with left being worse than right that  started 8/27. The pt was found to have T5-T7 lesion on MRI, admitted for further management.    #Back pain   # b/l LE tingling and numbness   -Continue management as per Neurosurgery team   -Will continue Steroids as per Neurosurgery team as well as PPI while on steroids     #Lymphadenopathy   -Pt s/p a CT CAP: mildly enlarged B/L axillary and Rt abd/pelvix LN. 4mm nonspecific lung nodule in the left upper lobe  -Pt s/p a PET: focal FDG uptake at T5-T7 correlation w/ tissue sampling may be considered. FDG cervical, supralcavicular, axillary,retroperitoneal mesenteric, and inguinal lymphadenopathy suspicious for metastatic, hypermetabolic left parotid nodule   -Pt was seen by Heme/Onc.  -Pt s/p axillary lymph node biopsy 8/30/24; Pt to follow up outpatient for biopsy results   -In addition Heme Onc discussed the need for possible second spinal lesion biopsy     #DVT prop  -As per Neurosurgery team     #DISPO  -Pt is for discharge today as per Neurosurgery team     60 minutes spent on total encounter. The necessity of the time spent during the encounter on this date of service was due to:    Review of hospital course, labs, vitals, radiology and medical records.  Direct patient encounter including bedside exam and interview.   Discussed plan of care with primary team.    Documenting the encounter.

## 2024-08-31 NOTE — DISCHARGE NOTE PROVIDER - CARE PROVIDERS DIRECT ADDRESSES
[email or web address]

## 2024-08-31 NOTE — DISCHARGE NOTE PROVIDER - CARE PROVIDER_API CALL
D'Amico, Randy Scott  Neurosurgery  [address] [address]  Phone: [phone]  Fax: [phone]  Follow Up Time:    D'Amico, Randy Scott  Neurosurgery  [address] [address]  Phone: [phone]  Fax: [phone]  Follow Up Time:     Kim Shell  Infectious Disease  [address]  [place] [address]  Phone: [phone]  Fax: [phone]  Follow Up Time:     Dinesh Vaz  Medical Oncology  [address]  [place] [address]  Phone: [phone]  Fax: [phone]  Follow Up Time:     Sanju Whitman  Radiation Oncology  [address] [address]  Phone: [phone]  Fax: [phone]  Follow Up Time:

## 2024-08-31 NOTE — DISCHARGE NOTE PROVIDER - DETAILS OF MALNUTRITION DIAGNOSIS/DIAGNOSES
This patient has been assessed with a concern for Malnutrition and was treated during this hospitalization for the following Nutrition diagnosis/diagnoses:     -  08/30/2024: Morbid obesity (BMI > 40)

## 2024-08-31 NOTE — DISCHARGE NOTE PROVIDER - HOSPITAL COURSE
HPI:  45 yo female with no significant PMH presented to hospital c/o pain in her back in between her shoulder blades that started last Thursday 8/22, says it "feels like a shock" when she takes deep breaths. Pt went to urgent care and was prescribed muscle relaxants with no relief of symptoms. Pt reports pain then increased and new symptoms of b/l LE tingling and numbness to her anterior thighs with left being worse than right started 8/27. Pt stated tingling radiates down anterior aspect of LLE down to ankle and RLE down to knee. Found to have T5-T7 lesion on MRI, admitted for further management.     Hospital Course:  8/28: Admitted to NSICU.   8/29: MARIMAR o/n, neuro stable, CT CAP and PET Scan done. Plan for IR biopsy in AM. NPO at midnight, IVF while NPO. Per ID, f/u blood cxs, no abx at this time.  8/30: MARIMAR overnight. Went to IR for biopsy. 10 IV dex, then 4q76, Rad onc consulted. Start protonix. SD. IVL.   8/31: MARIMAR overnight.     Patient evaluated by PT/OT who recommended: home no needs    The patient is going home.     Given the ongoing treatment plan, please note the patient has high potential for further admissions to deliver ongoing treatment and/or procedures as part of the normal course of neurosurgical care (e.g treatment of and is not necessarily due to acute conditions or complications).     Hospital course c/b:     Exam on day of discharge:  PHYSICAL EXAM:  General: NAD, pt is comfortably sitting up in bed, on room air  HEENT: PERRL 3mm briskly reactive, EOMI b/l, face symmetric, tongue midline, neck FROM  Cardiovascular: RRR, S1, S2  Respiratory: breathing non-labored on RA, chest rise symmetric  GI: abd soft, NTND   Neuro: A&Ox3, No aphasia, speech clear, no dysmetria, no pronator drift. Follows commands.  BENNETT x4 spontaneously, 5/5 strength in all extremities throughout. Sensation intact (subjective paresthesias [id number])  Extremities: distal pulses 2+ x4  Wound/incision: left axillary biopsy site c/d/i  Drain: n/a    Checklist:   - Obtained follow up appointment from NP  - Reviewed final recommendations of inpatient consults  - review discharge planning on provider handoff  - post op imaging completed  - Neurologically stable for discharge  - Vitals stable for discharge   - Afebrile for discharge  - WBC is stable  - Sodium level is normal  - Pain is adequately controlled  - Pt has PICC/walker/brace/collar   - LACE score (10 or > needs PCP apt)   - stroke patient? Discharge NIHSS score

## 2024-08-31 NOTE — DISCHARGE NOTE PROVIDER - NSDCMRMEDTOKEN_GEN_ALL_CORE_FT
acetaminophen 500 mg oral tablet: 2 tab(s) orally every 8 hours As needed Mild Pain (1 - 3)  dexAMETHasone 2 mg oral tablet: 1 tab(s) orally 2 tabs evry 6 hrs for one day (8/31),  1 tab every 6 hrs for two days (9/1 and 9/2), 1 tab every 8 hrs for two days (9/3 and 9/4), 1 tab every 12hrs indefinitely  ibuprofen 600 mg oral tablet: 1 tab(s) orally every 6 hours as needed for  severe pain  pantoprazole 40 mg oral delayed release tablet: 1 tab(s) orally  polyethylene glycol 3350 oral powder for reconstitution: 17 gram(s) orally once a day

## 2024-08-31 NOTE — DISCHARGE NOTE NURSING/CASE MANAGEMENT/SOCIAL WORK - NSDCFUADDAPPT_GEN_ALL_CORE_FT
Please follow up with Dr. D'Amico,     Please follow up with interventional radiology ...    Please follow up with infectious disease    Please follow up with DR. Vaz (hematology/oncology)    Please follow up with radiation/oncology    Please also follow up with your primary care.

## 2024-08-31 NOTE — DISCHARGE NOTE PROVIDER - NSDCCPTREATMENT_GEN_ALL_CORE_FT
No
PRINCIPAL PROCEDURE  Procedure: Needle biopsy, lymph node  Findings and Treatment: L axillary lymph node biopsy

## 2024-08-31 NOTE — CONSULT NOTE ADULT - SUBJECTIVE AND OBJECTIVE BOX
Pt seen and evaluated at bedside. In summary the pt is a 45 yo female with no significant PMH p/w  pain in her back in between her shoulder blades that started Thursday 8/22. Pt went to urgent care and was given a muscle relaxants without relief.  Pt reports  that she had b/l LE tingling and numbness to her anterior thighs with left being worse than right that  started 8/27. The pt was found to have T5-T7 lesion on MRI, admitted for further management.     PAST MEDICAL & SURGICAL HISTORY:  Uterine fibroid    Home Medications:  acetaminophen 500 mg oral tablet: 2 tab(s) orally every 8 hours As needed Mild Pain (1 - 3) (31 Aug 2024 09:57)  polyethylene glycol 3350 oral powder for reconstitution: 17 gram(s) orally once a day (31 Aug 2024 09:57)    Allergies: No Known Allergies    Social History:  Pt lives at home with  and kids. (28 Aug 2024 23:43)    VITAL SIGNS, INS/OUTS (last 24 hours):  Vital Signs Last 24 Hrs  T(C): 36.6 (31 Aug 2024 05:02), Max: 37.2 (30 Aug 2024 19:02)  T(F): 97.8 (31 Aug 2024 05:02), Max: 99 (30 Aug 2024 19:02)  HR: 92 (31 Aug 2024 08:52) (54 - 92)  BP: 118/70 (31 Aug 2024 08:52) (111/63 - 129/81)  BP(mean): 99 (30 Aug 2024 16:14) (99 - 99)  RR: 18 (31 Aug 2024 08:52) (15 - 22)  SpO2: 98% (31 Aug 2024 08:52) (97% - 100%)    Parameters below as of 31 Aug 2024 08:52  Patient On (Oxygen Delivery Method): room air          PHYSICAL EXAM:  NAD laying in bed  CTA b/l no wheezing or crackles  NL S1,S2 no mumurs   soft NT/ND + BS no rebound or guarding   AAox3; sensation intact; strength 5/5 b/l UE and LE; follows commands     BASIC LABS:                        12.1   10.69 )-----------( 244      ( 31 Aug 2024 08:24 )             37.9     08-31    137  |  104  |  13  ----------------------------<  134<H>  4.2   |  22  |  0.71    Ca    9.3      31 Aug 2024 08:24  Phos  3.3     08-31  Mg     2.2     08-31    TPro  7.0  /  Alb  3.9  /  TBili  x   /  DBili  x   /  AST  x   /  ALT  x   /  AlkPhos  x   08-29    MICRODATA:  8/28 Blood cx: No growth at 48 Hours  8/28 Urine cx: <10,000 CFU/mL Normal Urogenital Emely      MR T spine: space-occupying process is noted in the left dorsal epidural space centered at T6, measuring approximately 7 x 11 x 30 mm   CT CAP: mildly enlarged B/L axillary and Rt abd/pelvix LN. 4mm nonspecific lung nodule in the left upper lobe   PET: focal FDG uptake at T5-T7 correlation w/ tissue sampling may be considered. FDG cervical, supralcavicular, axillary,retroperitoneal mesenteric, and inguinal lymphadenopathy suspicious for metastatic, hypermetabolic left parotid nodule could represent primary parotid neoplasm. Diffuse FDG thyroiditis    CURRENT MEDICATIONS:   acetaminophen     Tablet .. 1000 milliGRAM(s) Oral every 8 hours PRN  dexAMETHasone     Tablet 4 milliGRAM(s) Oral every 6 hours  pantoprazole    Tablet 40 milliGRAM(s) Oral before breakfast  polyethylene glycol 3350 17 Gram(s) Oral daily        
A 44-year-old female with no notable past medical history presented to the hospital with complaints of back pain located between her shoulder blades, which began on Thursday, August 22. She describes the pain as a "shock" when taking deep breaths. She initially visited urgent care, where she was prescribed muscle relaxants, but experienced no relief.The pain worsened, and by August 27, she developed bilateral lower extremity tingling and numbness in her anterior thighs, with the left side being more affected than the right. The tingling extends from the anterior aspect of the left lower extremity down to the ankle and from the right lower extremity down to the knee. She denies experiencing saddle anesthesia, bowel or bladder incontinence, difficulty with walking, or any weakness.  The patient has no family history of cancer or hematologic disorders. 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  MR thoracic spine w/ A space-occupying process is noted in the left dorsal epidural space centered at T6, measuring approximately 7 x 11 x 30 mm. The lesion is hypointense before gadolinium, and becomes confluent with adjacent epidural fat on the T2 and postgadolinium images. There is lateral extension to the left T5-6 and T6-7 neural foramina, which are mildly narrowed.  Neurosurgery offering intervention but patient reluctant to proceed, pending PET CT. Oncology  consult of malignancy work up.     ANTIBIOTICS/RELEVANT:  MEDICATIONS  (STANDING):  chlorhexidine 2% Cloths 1 Application(s) Topical every 12 hours  chlorhexidine 2% Cloths 1 Application(s) Topical <User Schedule>  insulin lispro (ADMELOG) corrective regimen sliding scale   SubCutaneous Before meals and at bedtime  polyethylene glycol 3350 17 Gram(s) Oral daily  senna 2 Tablet(s) Oral at bedtime    MEDICATIONS  (PRN):  Vital Signs Last 24 Hrs  T(C): 36.9 (29 Aug 2024 09:12), Max: 37.8 (28 Aug 2024 15:22)  T(F): 98.4 (29 Aug 2024 09:12), Max: 100.1 (28 Aug 2024 15:22)  HR: 86 (29 Aug 2024 11:00) (81 - 104)  BP: 142/83 (29 Aug 2024 11:00) (120/81 - 158/89)  BP(mean): 107 (29 Aug 2024 11:00) (89 - 117)  RR: 19 (29 Aug 2024 11:00) (14 - 20)  SpO2: 100% (29 Aug 2024 11:00) (98% - 100%)    Parameters below as of 29 Aug 2024 11:00  Patient On (Oxygen Delivery Method): room air    PHYSICAL EXAM:  Physical Exam: Constitutional: Pt found in bed in NAD   ENT: PERRL, EOMi  Respiratory: breathing non-labored, symmetrical chest wall movement  Cardiovascuar: RRR, no murmurs  Gastrointestinal: abdomen soft, non tender  Extr: well  perfused  Skin: dry, warm   Neurological:  AAOX3. Verbal function intact  Cranial Nerves: II-XII intact  Motor: 5/5 power in b/l UE and LE  Sensation: intact to light touch in all extremities, endorses slight tingling in BLLE up to mid torso       LABS:                        11.6   6.27  )-----------( 206      ( 28 Aug 2024 15:01 )             38.0     08-28    140  |  105  |  12  ----------------------------<  87  5.0   |  22  |  0.76    Ca    9.3      28 Aug 2024 15:01    TPro  8.0  /  Alb  4.3  /  TBili  0.5  /  DBili  x   /  AST  20  /  ALT  14  /  AlkPhos  60  08-28    PT/INR - ( 28 Aug 2024 15:01 )   PT: 11.5 sec;   INR: 1.01        
INFECTIOUS DISEASES INITIAL CONSULT NOTE    HPI:  43 yo female with no significant PMH presented to hospital c/o back pain x 2 weeks. ID consulted for T5-7 epidural lesion.  Patient states she was in [facility] on vacation from -8/15 (her parents live there). Her back pain (between her shoulder blades) started the that following weekend (-). She went to urgent care on , had blood work drawn and was given muscle relaxant which she took without relief. Pain increased and developed b/l LE tingling to her anterior thighs. Denies fevers, chills, N/V/D, urinary or fecal incontinence, numbness, or weakness. She presented to ED on . Upon arrival, 99.9, , no leukocytosis. Labs notable for ESR 33, CRP 11.9, sCr 0.76. CXR unremarkable, bcxs x 2 sent. MRI thoracic spine w/wo IVC showed a space-occupying process in the left dorsal epidural space centered at T6, measuring approx [id number] with mod to severe T5-7 central canal stenoses. Not on antibiotics. It was recommended she get CT CAP but reportedly refusing. Pending PET scan. Heme/onc following for metastatic workup. Today, patient states pain in back is improved but still has tingling in b/l anterior thighs.        PAST MEDICAL & SURGICAL HISTORY:  Uterine fibroid    c section x 2 (, )      Review of Systems:   Constitutional, eyes, ENT, cardiovascular, respiratory, gastrointestinal, genitourinary, integumentary, neurological, psychiatric and heme/lymph are otherwise negative other than noted above       ANTIBIOTICS:  MEDICATIONS  (STANDING):  chlorhexidine 2% Cloths 1 Application(s) Topical <User Schedule>  chlorhexidine 2% Cloths 1 Application(s) Topical every 12 hours  insulin lispro (ADMELOG) corrective regimen sliding scale   SubCutaneous Before meals and at bedtime  polyethylene glycol 3350 17 Gram(s) Oral daily  senna 2 Tablet(s) Oral at bedtime    MEDICATIONS  (PRN):      Allergies    No Known Allergies    Intolerances      SOCIAL HISTORY:  -born in    -lives in [place] with  and children (3)   -works desk job, 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[place] [employer]   -denies tobacco, etoh, recreational drug use       FAMILY HISTORY:   no FH leading to current infection    Vital Signs Last 24 Hrs  T(C): 36.7 (29 Aug 2024 14:16), Max: 37.1 (28 Aug 2024 18:02)  T(F): 98.1 (29 Aug 2024 14:16), Max: 98.8 (28 Aug 2024 18:02)  HR: 93 (29 Aug 2024 14:23) (81 - 104)  BP: 147/94 (29 Aug 2024 14:23) (120/81 - 158/89)  BP(mean): 115 (29 Aug 2024 14:23) (89 - 117)  RR: 20 (29 Aug 2024 14:23) (14 - 20)  SpO2: 100% (29 Aug 2024 14:) (98% - 100%)    Parameters below as of 29 Aug 2024 14:23  Patient On (Oxygen Delivery Method): room air        24 @ 07:01  -  24 @ 07:00  --------------------------------------------------------  IN: 900 mL / OUT: 1125 mL / NET: -225 mL    24 @ 07:  -  24 @ 16:26  --------------------------------------------------------  IN: 300 mL / OUT: 775 mL / NET: -475 mL      PHYSICAL EXAM:  Constitutional: alert, NAD  Eyes: the sclera and conjunctiva were normal.   ENT: the ears and nose were normal in appearance.   Neck: the appearance of the neck was normal and the neck was supple.   Pulmonary: no respiratory distress and lungs were clear to auscultation bilaterally.   Heart: heart rate was normal and rhythm regular, normal S1 and S2  Vascular:. there was no peripheral edema  Abdomen: normal bowel sounds, soft, non-tender  MSK: no spinal or paraspinal tenderness of c/t/l spine.   Neurological: no focal deficits. 5/5 strength b/l LE. B/l LE sensation grossly intact.   Psychiatric: the affect was normal      LABS:                        11.6   6.27  )-----------( 206      ( 28 Aug 2024 15:01 )             38.0         140  |  105  |  12  ----------------------------<  87  5.0   |  22  |  0.76    Ca    9.3      28 Aug 2024 15:01    TPro  8.0  /  Alb  4.3  /  TBili  0.5  /  DBili  x   /  AST  20  /  ALT  14  /  AlkPhos  60  -    PT/INR - ( 28 Aug 2024 15:01 )   PT: 11.5 sec;   INR: 1.01            Urinalysis Basic - ( 28 Aug 2024 16:43 )    Color: Yellow / Appearance: Clear / S.020 / pH: x  Gluc: x / Ketone: 40 mg/dL  / Bili: Negative / Urobili: 0.2 mg/dL   Blood: x / Protein: Negative mg/dL / Nitrite: Negative   Leuk Esterase: Small / RBC: 1 /HPF / WBC 9 /HPF   Sq Epi: x / Non Sq Epi: 3 /HPF / Bacteria: Negative /HPF      MICROBIOLOGY:    RADIOLOGY & ADDITIONAL STUDIES:  reviewed

## 2024-08-31 NOTE — DISCHARGE NOTE PROVIDER - NSDCFUADDINST_GEN_ALL_CORE_FT
Neurosurgery follow up appointment date/time: ________  - please call the office to confirm appointment: [phone]    Wound Care:  - you can shower  - does dressing need to be changed/removed?  - no picking at incision    Activity:  - fatigue is common after hospitalization, rest if you feel tired   - no bending, lifting, twisting or heavy lifting   - walking is recommended, ambulate as tolerated  - you may shower when you get home  - no soaking in a tub/pool/hot tub   - no driving within 24 hours of anesthesia or while taking prescription pain medications   - keep hydrated, drink plenty of water     Inpatient consults:  - ID:  - Heme/Onc: please follow up with Dr. Vaz  - Rad/Onc:   - final recommendations  - you will need follow up with....    Please also follow up with your primary care doctor.     Pain expectations:  - please take your steroid taper as prescribed     Medications:  - changes to home meds (ex. AED's)?  - new meds?  - pain meds?  - when can antiplatelets or anticoagulants be restarted?  - were adverse affects of meds discussed with patients?   - pain medications can cause constipation, you should eat a high fiber diet and may take a stool softener while on pain meds   - Avoid taking Advil (ibuprofen), Motrin (naproxen), or Aspirin for pain as they can cause bleeding     Call the office or come to ED if:  - wound has drainage or bleeding, increased redness or pain at incision site, neurological change, fever (>101), chills, night sweats, syncope, nausea/vomiting, chest pain, shortness of breath      Playback:  - are discharge instruction on playback?  - is a picture of the incision on playback?     WITHIN 24 HOURS OF DISCHARGE, PLEASE CONTACT NEURO PA  WITH ANY QUESTIONS OR CONCERNS: [phone]   OTHERWISE, PLEASE CALL THE OFFICE WITH ANY QUESTIONS OR CONCERNS: [phone] Neurosurgery follow up appointment date/time: Tuesday 9/10 with Dr. D'Amico and Dr. Vaz at [facility] location - [phone]  - please call the office to confirm appointment: [phone]    Wound Care:  - you can shower  - does dressing need to be changed/removed?  - no picking at incision    Activity:  - fatigue is common after hospitalization, rest if you feel tired   - no bending, lifting, twisting or heavy lifting   - walking is recommended, ambulate as tolerated  - you may shower when you get home  - no soaking in a tub/pool/hot tub   - no driving within 24 hours of anesthesia or while taking prescription pain medications   - keep hydrated, drink plenty of water     Inpatient consults:  - ID:  - Heme/Onc: please follow up with Dr. Vaz  - Rad/Onc:   - final recommendations  - you will need follow up with....    Please also follow up with your primary care doctor.     Pain expectations:  - please take your steroid taper as prescribed     Medications:  - changes to home meds (ex. AED's)?  - new meds?  - pain meds?  - when can antiplatelets or anticoagulants be restarted?  - were adverse affects of meds discussed with patients?   - pain medications can cause constipation, you should eat a high fiber diet and may take a stool softener while on pain meds   - Avoid taking Advil (ibuprofen), Motrin (naproxen), or Aspirin for pain as they can cause bleeding     Call the office or come to ED if:  - wound has drainage or bleeding, increased redness or pain at incision site, neurological change, fever (>101), chills, night sweats, syncope, nausea/vomiting, chest pain, shortness of breath      Playback:  - are discharge instruction on playback?  - is a picture of the incision on playback?     WITHIN 24 HOURS OF DISCHARGE, PLEASE CONTACT NEURO PA  WITH ANY QUESTIONS OR CONCERNS: [phone]   OTHERWISE, PLEASE CALL THE OFFICE WITH ANY QUESTIONS OR CONCERNS: [phone] Neurosurgery follow up appointment date/time: Tuesday 9/10 with Dr. D'Amico and Dr. Vaz at [facility] location - [phone] address: [address] [place] [address]  - please call the office to confirm appointment: [phone] or [phone]    Wound Care:  - no picking at incision    Activity:  - fatigue is common after hospitalization, rest if you feel tired   - no bending, lifting, twisting or heavy lifting   - walking is recommended, ambulate as tolerated  - you may shower when you get home  - no soaking in a tub/pool/hot tub   - no driving within 24 hours of anesthesia or while taking prescription pain medications   - keep hydrated, drink plenty of water     Inpatient consults:  - ID: Please follow-up with Dr. Shell  - Heme/Onc: please follow up with Dr. Vaz  - Rad/Onc: Please follow-up with Dr. Whitman      Please also follow up with your primary care doctor.     Medications:  - Please take your medications as prescribed: dexamethasone [2 tabs every 6hrs for one day (8/31), 1 tab every 6hrs for two days (9/1 and 9/2), 1 tab every 8hrs for two days (9/3 and 9/4), 1 tab every 12 hrs indefinitely, pantoprazole 40mg daily while on steroids, you may take tylenol as needed every 6 hours as needed for mild pain  - pain medications can cause constipation, you should eat a high fiber diet and may take a stool softener while on pain meds   - Avoid taking Advil (ibuprofen), Motrin (naproxen), or Aspirin for pain as they can cause bleeding     Call the office or come to ED if:  - wound has drainage or bleeding, increased redness or pain at incision site, neurological change, fever (>101), chills, night sweats, syncope, nausea/vomiting, chest pain, shortness of breath      Playback:  - A copy of your discharge instructions are posted on playback    WITHIN 24 HOURS OF DISCHARGE, PLEASE CONTACT NEURO PA  WITH ANY QUESTIONS OR CONCERNS: [phone]   OTHERWISE, PLEASE CALL THE OFFICE WITH ANY QUESTIONS OR CONCERNS: [phone] Neurosurgery follow up appointment date/time: Tuesday 9/10 with Dr. D'Amico and Dr. Vaz at [facility] location - [phone] address: [address] [place] [address]  - please call the office to confirm appointment: [phone] or [phone]    Wound Care:  - no picking at incision    Activity:  - fatigue is common after hospitalization, rest if you feel tired   - no bending, lifting, twisting or heavy lifting   - walking is recommended, ambulate as tolerated  - you may shower when you get home  - no soaking in a tub/pool/hot tub   - no driving within 24 hours of anesthesia or while taking prescription pain medications   - keep hydrated, drink plenty of water     Inpatient consults:  - ID: Please follow-up with Dr. Shell  - Heme/Onc: please follow up with Dr. Vaz  - Rad/Onc: Please follow-up with Dr. Whitman      Please also follow up with your primary care doctor.     Medications:  - Please take your medications as prescribed: dexamethasone [2 tabs every 6hrs for one day (8/31), 1 tab every 6hrs for two days (9/1 and 9/2), 1 tab every 8hrs for two days (9/3 and 9/4), 1 tab every 12 hrs indefinitely, pantoprazole 40mg daily while on steroids, you may take tylenol as needed every 6 hours as needed for mild pain, you may take ibuprofen as needed every 6 hours as needed for severe pain  - pain medications can cause constipation, you should eat a high fiber diet and may take a stool softener while on pain meds   - Avoid taking Aspirin for pain as it can cause bleeding     Call the office or come to ED if:  - wound has drainage or bleeding, increased redness or pain at incision site, neurological change, fever (>101), chills, night sweats, syncope, nausea/vomiting, chest pain, shortness of breath      Playback:  - A copy of your discharge instructions are posted on playback    WITHIN 24 HOURS OF DISCHARGE, PLEASE CONTACT NEURO PA  WITH ANY QUESTIONS OR CONCERNS: [phone]   OTHERWISE, PLEASE CALL THE OFFICE WITH ANY QUESTIONS OR CONCERNS: [phone]

## 2024-08-31 NOTE — PROGRESS NOTE ADULT - NUTRITIONAL ASSESSMENT
This patient has been assessed with a concern for Malnutrition and has been determined to have a diagnosis/diagnoses of Morbid obesity (BMI > 40).    This patient is being managed with:   Diet Regular-  Entered: Aug 30 2024  4:59PM

## 2024-08-31 NOTE — PROGRESS NOTE ADULT - SUBJECTIVE AND OBJECTIVE BOX
HPI:  43 yo female with no significant PMH presented to hospital c/o pain in her back in between her shoulder blades that started last Thursday 8/22, says it "feels like a shock" when she takes deep breaths. Pt went to urgent care and was prescribed muscle relaxants with no relief of symptoms. Pt reports pain then increased and new symptoms of b/l LE tingling and numbness to her anterior thighs with left being worse than right started 8/27. Pt stated tingling radiates down anterior aspect of LLE down to ankle and RLE down to knee. Denies saddles anesthesia, bowel/bladder incontinence, difficulty with ambulation or weakness.  (28 Aug 2024 23:43)    OVERNIGHT EVENTS: Seen and examined in 9WO. Endorses paresthesias from T10 to toes. Denies HA, N/V, chest pain, shortness of breath, new weakness or numbness.     HOSPITAL COURSE:  8/28: Admitted to NSICU.   8/29: MARIMAR o/n, neuro stable, CT CAP and PET Scan done. Plan for IR biopsy in AM. NPO at midnight, IVF while NPO. Per ID, f/u blood cxs, no abx at this time.  8/30: MARIMAR overnight. Went to IR for biopsy. 10 IV dex, then 4q76, Rad onc consulted. Start protonix. SD. IVL.   8/31: MARIMAR overnight.     Vital Signs Last 24 Hrs  T(C): 37 (30 Aug 2024 21:02), Max: 37.2 (30 Aug 2024 19:02)  T(F): 98.6 (30 Aug 2024 21:02), Max: 99 (30 Aug 2024 19:02)  HR: 84 (30 Aug 2024 21:02) (64 - 93)  BP: 125/70 (30 Aug 2024 21:02) (113/64 - 139/63)  BP(mean): 99 (30 Aug 2024 16:14) (77 - 108)  RR: 22 (30 Aug 2024 21:02) (14 - 22)  SpO2: 98% (30 Aug 2024 21:02) (97% - 100%)    Parameters below as of 30 Aug 2024 21:02  Patient On (Oxygen Delivery Method): room air        I&O's Summary    29 Aug 2024 07:01  -  30 Aug 2024 07:00  --------------------------------------------------------  IN: 975 mL / OUT: 1625 mL / NET: -650 mL    30 Aug 2024 07:01  -  31 Aug 2024 01:39  --------------------------------------------------------  IN: 825 mL / OUT: 750 mL / NET: 75 mL        PHYSICAL EXAM:  General: NAD, pt is comfortably sitting up in bed, on room air  HEENT: PERRL 3mm briskly reactive, EOMI b/l, face symmetric, tongue midline, neck FROM  Cardiovascular: RRR, S1, S2  Respiratory: breathing non-labored on RA, chest rise symmetric  GI: abd soft, NTND   Neuro: A&Ox3, No aphasia, speech clear, no dysmetria, no pronator drift. Follows commands.  BENNETT x4 spontaneously, 5/5 strength in all extremities throughout. Sensation intact (subjective paresthesias K86-ulbr)  Extremities: distal pulses 2+ x4  Wound/incision: left axillary biopsy site c/d/i  Drain: n/a    TUBES/LINES:  [] Rodriguez  [] Wound Drains  [] Others      DIET:  [] NPO  [x] Mechanical  [] Tube feeds    LABS:                        11.0   7.41  )-----------( 211      ( 29 Aug 2024 18:09 )             35.0     08-29    136  |  105  |  9   ----------------------------<  100<H>  4.1   |  22  |  0.78    Ca    8.8      29 Aug 2024 18:09  Phos  2.9     08-29  Mg     2.0     08-29    TPro  7.0  /  Alb  x   /  TBili  x   /  DBili  x   /  AST  x   /  ALT  x   /  AlkPhos  x   08-29      Urinalysis Basic - ( 29 Aug 2024 18:09 )    Color: x / Appearance: x / SG: x / pH: x  Gluc: 100 mg/dL / Ketone: x  / Bili: x / Urobili: x   Blood: x / Protein: x / Nitrite: x   Leuk Esterase: x / RBC: x / WBC x   Sq Epi: x / Non Sq Epi: x / Bacteria: x          CAPILLARY BLOOD GLUCOSE          Drug Levels: [] N/A    CSF Analysis: [] N/A      Allergies    No Known Allergies    Intolerances      MEDICATIONS:  Antibiotics:    Neuro:  acetaminophen     Tablet .. 1000 milliGRAM(s) Oral every 8 hours PRN    Anticoagulation:    OTHER:  chlorhexidine 2% Cloths 1 Application(s) Topical <User Schedule>  dexAMETHasone     Tablet 4 milliGRAM(s) Oral every 6 hours  pantoprazole    Tablet 40 milliGRAM(s) Oral before breakfast  polyethylene glycol 3350 17 Gram(s) Oral daily    IVF:    CULTURES:  Culture Results:   No growth at 24 hours (08-28 @ 20:45)  Culture Results:   No growth at 24 hours (08-28 @ 20:40)    RADIOLOGY & ADDITIONAL TESTS:  < from: IR Procedure. (08.30.24 @ 10:11) >  Findings: There is a 1.6 x 1 cm left axillary lymph node. FNA and core    samples were obtained as described above. Permanent ultrasound images of   the needles within the lymph node were obtained.    Impression: Fine needle aspirate biopsy of left axillary lymph node as   described above.    Dr. Kevin Salter was present during the entire examination    --- End of Report ---    < end of copied text >      ASSESSMENT:  43 yo female with no significant PMH presented to hospital c/o pain in her back in between her shoulder blades that started last Thursday 8/22, says it "feels like a shock" when she takes deep breaths. Pt went to urgent care and was prescribed muscle relaxants with no relief of symptoms. Pt reports pain then increased and new symptoms of b/l LE tingling and numbness to her anterior thighs with left being worse than right started 8/27. Pt stated tingling radiates down anterior aspect of LLE down to ankle and RLE down to knee. Found to have T5-T7 lesion on MRI, admitted for further mgmt.     CORD COMPRESSION    Handoff    MEWS Score    Uterine fibroid    Cord compression    BACK PAIN    [email or web address]        PLAN:  Neuro:  - neuro/vitals q4  - pain control  - ?Lymphoma: s/p 10 IV dec, dec 4q6  - MR T spine: space-occupying process is noted in the left dorsal epidural space centered at T6, measuring approximately 7 x 11 x 30 mm  - CT CAP: mildly enlarged B/L axillary and Rt abd/pelvix LN. 4mm nonspecific lung nodule in the left upper lobe  - PET: focal FDG uptake at T5-T7 correlation w/ tissue sampling may be considered. FDG cervical, supralcavicular, axillary,retroperitoneal mesenteric, and inguinal lymphadenopathy suspicious for metastatic, hypermetabolic left parotid nodule could represent primary parotid neoplasm. Diffuse FDG thyroiditis    CV:  - -160    Resp:  - RA    GI:  - Regular diet  - bowel reg  - GI prohylaxis: Protonix 40mg while on steroids     Renal:  - IVL  - voiding    Endo:  - A1c 5.1    Heme:  - SCDs  - held SQL ppx for IR bx    ID:  - afebrile  - ID consulted, recommend f/u blood cxs, no abx at this time    Dispo: tele, s/p IR bx, PT in AM, dispo to home     D/w Dr. D'Amico  Assessment:  Present when checked    []  GCS  E   V  M     Heart Failure: []Acute, [] acute on chronic , []chronic  Heart Failure:  [] Diastolic (HFpEF), [] Systolic (HFrEF), []Combined (HFpEF and HFrEF), [] RHF, [] Pulm HTN, [] Other    [] ELYSE, [] ATN, [] AIN, [] other  [] CKD1, [] CKD2, [] CKD 3, [] CKD 4, [] CKD 5, []ESRD    Encephalopathy: [] Metabolic, [] Hepatic, [] toxic, [] Neurological, [] Other    Abnormal Nurtitional Status: [] malnurtition (see nutrition note), [ ]underweight: BMI < 19, [] morbid obesity: BMI >40, [] Cachexia    [] Sepsis  [] hypovolemic shock,[] cardiogenic shock, [] hemorrhagic shock, [] neuogenic shock  [] Acute Respiratory Failure  []Cerebral edema, [] Brain compression/ herniation,   [] Functional quadriplegia  [] Acute blood loss anemia

## 2024-08-31 NOTE — DISCHARGE NOTE PROVIDER - NSDCACTIVITY_GEN_ALL_CORE
Bathing allowed/Showering allowed/Stairs allowed/Walking - Indoors allowed/No heavy lifting/straining/Walking - Outdoors allowed/Activity as tolerated

## 2024-08-31 NOTE — DISCHARGE NOTE PROVIDER - NSDCFUADDAPPT_GEN_ALL_CORE_FT
Please follow up with Dr. D'Amico,     Please follow up with interventional radiology ...    Please follow up with infectious disease:    Please follow up with DR. Vaz (hematology/oncology)    Please follow up with radiation/oncology... .    Please also follow up with your primary care.  Please follow up with Dr. D'Amico,     Please follow up with interventional radiology ...    Please follow up with infectious disease    Please follow up with DR. Vaz (hematology/oncology)    Please follow up with radiation/oncology    Please also follow up with your primary care.

## 2024-08-31 NOTE — DISCHARGE NOTE PROVIDER - PROVIDER TOKENS
PROVIDER:[TOKEN:[[id number]]] PROVIDER:[TOKEN:[[id number]]],PROVIDER:[TOKEN:[[id number]]],PROVIDER:[TOKEN:[[id number]]],PROVIDER:[TOKEN:[[id number]]]

## 2024-09-03 ENCOUNTER — APPOINTMENT (OUTPATIENT)
Dept: NEUROSURGERY | Facility: CLINIC | Age: 44
End: 2024-09-03

## 2024-09-03 PROBLEM — D25.9 LEIOMYOMA OF UTERUS, UNSPECIFIED: Chronic | Status: ACTIVE | Noted: 2024-08-29

## 2024-09-03 LAB
CULTURE RESULTS: SIGNIFICANT CHANGE UP
CULTURE RESULTS: SIGNIFICANT CHANGE UP
SPECIMEN SOURCE: SIGNIFICANT CHANGE UP
SPECIMEN SOURCE: SIGNIFICANT CHANGE UP

## 2024-09-04 LAB
NON-GYNECOLOGICAL CYTOLOGY STUDY: SIGNIFICANT CHANGE UP
SURGICAL PATHOLOGY STUDY: SIGNIFICANT CHANGE UP

## 2024-09-05 ENCOUNTER — APPOINTMENT (OUTPATIENT)
Dept: NEUROSURGERY | Facility: CLINIC | Age: 44
End: 2024-09-05
Payer: COMMERCIAL

## 2024-09-05 ENCOUNTER — APPOINTMENT (OUTPATIENT)
Dept: NEUROSURGERY | Facility: CLINIC | Age: 44
End: 2024-09-05

## 2024-09-05 DIAGNOSIS — R59.0 LOCALIZED ENLARGED LYMPH NODES: ICD-10-CM

## 2024-09-05 DIAGNOSIS — D43.9 NEOPLASM OF UNCERTAIN BEHAVIOR OF CENTRAL NERVOUS SYSTEM, UNSPECIFIED: ICD-10-CM

## 2024-09-05 DIAGNOSIS — G95.20 UNSPECIFIED CORD COMPRESSION: ICD-10-CM

## 2024-09-05 DIAGNOSIS — R91.1 SOLITARY PULMONARY NODULE: ICD-10-CM

## 2024-09-05 DIAGNOSIS — E66.01 MORBID (SEVERE) OBESITY DUE TO EXCESS CALORIES: ICD-10-CM

## 2024-09-05 DIAGNOSIS — R20.2 ANESTHESIA OF SKIN: ICD-10-CM

## 2024-09-05 DIAGNOSIS — M48.04 SPINAL STENOSIS, THORACIC REGION: ICD-10-CM

## 2024-09-05 DIAGNOSIS — R20.0 ANESTHESIA OF SKIN: ICD-10-CM

## 2024-09-05 DIAGNOSIS — G96.198 OTHER DISORDERS OF MENINGES, NOT ELSEWHERE CLASSIFIED: ICD-10-CM

## 2024-09-05 DIAGNOSIS — G99.2 MYELOPATHY IN DISEASES CLASSIFIED ELSEWHERE: ICD-10-CM

## 2024-09-05 DIAGNOSIS — D25.9 LEIOMYOMA OF UTERUS, UNSPECIFIED: ICD-10-CM

## 2024-09-05 PROCEDURE — 99442: CPT

## 2024-09-05 RX ORDER — DEXAMETHASONE 6 MG/1
TABLET ORAL
Refills: 0 | Status: ACTIVE | COMMUNITY

## 2024-09-05 RX ORDER — PANTOPRAZOLE 40 MG/1
40 TABLET, DELAYED RELEASE ORAL
Refills: 0 | Status: ACTIVE | COMMUNITY

## 2024-09-05 RX ORDER — IBUPROFEN 800 MG/1
TABLET, FILM COATED ORAL
Refills: 0 | Status: ACTIVE | COMMUNITY

## 2024-09-09 ENCOUNTER — TRANSCRIPTION ENCOUNTER (OUTPATIENT)
Age: 44
End: 2024-09-09

## 2024-09-09 NOTE — ASSESSMENT
[FreeTextEntry1] : - Assessment : The patient is a 44-year-old female with a known enhancing lesion in the spine, which could represent a neoplasm or tumor. A PET scan showed the lesion and lymph nodes were positive. However, a lymph node biopsy did not reveal any evidence of lymphoma. The patient is experiencing ongoing symptoms related to the spinal lesion, and a definitive diagnosis is still needed. - Plan : The recommended plan is to schedule the patient for a spine biopsy to determine the nature of the spinal lesion. This is necessary to establish a diagnosis and guide appropriate treatment. The patient has reservations about the biopsy and plans to seek second and third opinions. The physician will send the patient an email with their contact information to facilitate further communication and coordination of care.  Dr. D'Amico independently reviewed all available images/pathology with patient via telephone.    Recommended to stop steroids and plan for biopsy of spine lesion.  She is working on second opinions and will notify of decision. Email follow- up and VM left for f/u contact info.   Today, my ACP, Latah Boykin, was here to observe my evaluation and management services for this new problem/exacerbated condition to be followed going forward.     A total of 15 minutes was spent reviewing the labs, imaging and physical examination of the patient. We discussed the diagnosis, and the plan. The patient's questions were answered. The patient demonstrated an excellent understanding of the plan.

## 2024-09-09 NOTE — HISTORY OF PRESENT ILLNESS
[FreeTextEntry1] : 43 y/o female with no significant PMHx who presented 8/28/24 with back in between her shoulder blades that started 8/22/24 and b/l LE tingling and numbness to her anterior thighs with left being worse than right that started 8/27/24 and was found to have T5-T7 lesion on MRI.   In brief: - 8/28/24 pt presented to [facility] with back in between her shoulder blades that started 8/22/24 and b/l LE tingling and numbness to her anterior thighs with left being worse than right that started 8/27/24.  - 8/28/24 MRI thoracic: space-occupying process is noted in the left dorsal epidural space centered at T6, measuring approximately 7 x 11 x 30 mm - 8/29/24 PET: focal FDG uptake at T5-T7 correlation w/ tissue sampling may be considered. FDG cervical, supralcavicular, axillary,retroperitoneal mesenteric, and inguinal lymphadenopathy suspicious for metastatic, hypermetabolic left parotid nodule  - Seen by heme/onc  - 8/30/24 S/P Axillary lymph node biopsy 8/30/24; seen by heme onc who discussed the need for possible second spinal lesion biopsy.  - 8/31/24 dc home with path pending. Dc on dex taper down to 2 mg BID  PATH: Lymph node, left, axillary; core biopsy: Lymphoid tissue with no evidence of lymphoma.  - heterogeneous lymphocytes without aberrant antigen expression or B-cell monotypia.   Presents today for post hospital follow- up via phone call.  She continues on the steroids.

## 2024-09-09 NOTE — HISTORY OF PRESENT ILLNESS
[FreeTextEntry1] : 45 y/o female with no significant PMHx who presented 8/28/24 with back in between her shoulder blades that started 8/22/24 and b/l LE tingling and numbness to her anterior thighs with left being worse than right that started 8/27/24 and was found to have T5-T7 lesion on MRI.   In brief: - 8/28/24 pt presented to [facility] with back in between her shoulder blades that started 8/22/24 and b/l LE tingling and numbness to her anterior thighs with left being worse than right that started 8/27/24.  - 8/28/24 MRI thoracic: space-occupying process is noted in the left dorsal epidural space centered at T6, measuring approximately 7 x 11 x 30 mm - 8/29/24 PET: focal FDG uptake at T5-T7 correlation w/ tissue sampling may be considered. FDG cervical, supralcavicular, axillary,retroperitoneal mesenteric, and inguinal lymphadenopathy suspicious for metastatic, hypermetabolic left parotid nodule  - Seen by heme/onc  - 8/30/24 S/P Axillary lymph node biopsy 8/30/24; seen by heme onc who discussed the need for possible second spinal lesion biopsy.  - 8/31/24 dc home with path pending. Dc on dex taper down to 2 mg BID  PATH: Lymph node, left, axillary; core biopsy: Lymphoid tissue with no evidence of lymphoma.  - heterogeneous lymphocytes without aberrant antigen expression or B-cell monotypia.   Presents today for post hospital follow- up via phone call.  She continues on the steroids.

## 2024-09-09 NOTE — ASSESSMENT
[FreeTextEntry1] : - Assessment : The patient is a 44-year-old female with a known enhancing lesion in the spine, which could represent a neoplasm or tumor. A PET scan showed the lesion and lymph nodes were positive. However, a lymph node biopsy did not reveal any evidence of lymphoma. The patient is experiencing ongoing symptoms related to the spinal lesion, and a definitive diagnosis is still needed. - Plan : The recommended plan is to schedule the patient for a spine biopsy to determine the nature of the spinal lesion. This is necessary to establish a diagnosis and guide appropriate treatment. The patient has reservations about the biopsy and plans to seek second and third opinions. The physician will send the patient an email with their contact information to facilitate further communication and coordination of care.  Dr. D'Amico independently reviewed all available images/pathology with patient via telephone.    Recommended to stop steroids and plan for biopsy of spine lesion.  She is working on second opinions and will notify of decision. Email follow- up and VM left for f/u contact info.   Today, my ACP, Latha Boykin, was here to observe my evaluation and management services for this new problem/exacerbated condition to be followed going forward.     A total of 15 minutes was spent reviewing the labs, imaging and physical examination of the patient. We discussed the diagnosis, and the plan. The patient's questions were answered. The patient demonstrated an excellent understanding of the plan.

## 2024-09-09 NOTE — DATA REVIEWED
[de-identified] :    ACC: [id number] EXAM: MR SPINE THORACIC WAW IC ORDERED BY: LORIE JAVIER  PROCEDURE DATE: 08/28/2024    INTERPRETATION: EXAMINATION: MR THORACIC SPINE WITHOUT AND WITH IV CONTRAST  CLINICAL INDICATION: eval infection/compression TECHNIQUE: Multiplanar MRI images of the thoracic spine were obtained before and after IV injection of gadolinium, 10 cc administered. COMPARISON: None.  FINDINGS:  A space-occupying process is noted in the left dorsal epidural space centered at T6, measuring approximately 7 x 11 x 30 mm. The lesion is hypointense before gadolinium, and becomes confluent with adjacent epidural fat on the T2 and postgadolinium images. There is lateral extension to the left T5-6 and T6-7 neural foramina, which are mildly narrowed.  The appearance is nonspecific and could represent a lipid rich neoplasm vs epidural lipomatosis. Clinical correlation will be required to exclude endogenous or exogenous steroid excess. Differential diagnostic considerations would include both benign and malignant etiologies, including lipoma, spinal angiolipoma, meningioma, and Hirayama disease. There is associated mass effect on the cord and narrowing of the central canal related to this process:  T3-4: Mild narrowing of the central canal. T4-5: Moderate narrowing of the central canal, with effacement of CSF signal in the thecal sac. T5-6, T6-7: Moderate to severe central canal stenosis. Complete effacement of CSF signal, with flattening of the left dorsal cord, which appears compressed. Clinical correlation will be required to exclude acute myelopathic symptoms, which should be a clinical diagnosis. Consider surgical consultation. T7-8: Moderate narrowing of the central canal. T8-9: Mild narrowing of the central canal. T9-T10: Shallow right paracentral protrusion and bilateral facet arthritis, with mild narrowing of the central canal.  The central canal and neural foramina in the thoracic spine are otherwise negative. Vertebral body height, alignment, marrow, and cord are otherwise negative. The paraspinal musculature is normal in signal and bulk.  IMPRESSION:  1. 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 2. Correlate clinically to exclude steroid excess. Consider surgical consultation.    Patient Name: ELOY SOUTH MRN: [id number], Accession: [id number] DOS: 08/28/24 06:11 PM  --- End of Report ---     [de-identified] : ACC: [id number] EXAM: PETCT [facility] ONC FDG INIT ORDERED BY: SIS SOLBAM  PROCEDURE DATE: 08/29/2024    INTERPRETATION: 18F-FDG PET/CT  INDICATION: Spinal cord lesion. Assess for malignancy. Initial staging study.  COMPARISON: MRI of the thoracic spine from 8/20/2024. CT of the chest, abdomen, and pelvis from 8/29/2024.  TECHNIQUE: 12.3 mCi of F-18 FDG administered intravenously. After a 65 minute incubation time, PET images were obtained from skull base to mid thigh. Axial CT images were obtained for localization and attenuation correction only. Sagittal and coronal reformatted images were obtained. Fasting blood glucose level of 93 mg/dL.  FINDINGS: Head and Neck: Hypermetabolic cervical and supraclavicular lymph nodes including bilateral level IB, IIA, IIB, and III; representative nodes include left 1.2 x 1.2 cm, SUV max 6.1, node just anterior to the left submandibular gland (series 4 image 28) and hypermetabolic 1.3 x 1.1 cm, SUV max 4.0, left supraclavicular node at the level of the cricoid cartilage (level III; series 4 image 36). Marked diffuse FDG uptake throughout the thyroid. Hypermetabolic left parotid 0.5 cm, SUV max 5.1, hyperdense nodule (series 4 image 20). Physiologic uptake is noted in the salivary glands, oropharynx, and larynx.  Chest: Bilateral, left greater than right, hypermetabolic axillary lymph nodes with representative node on the left measuring 1.2 x 1.2 cm, SUV max 6.0 (series 4 image 60). No hypermetabolic pulmonary nodules. No hypermetabolic mediastinal lymphadenopathy. No abnormal foci of uptake in the breasts.  Abdomen and Pelvis: Few hypermetabolic retroperitoneal and mesenteric lymph nodes, most prominent within the pelvis including representative 1.8 x 2.0 cm, SUV max 4.8 left external iliac node (series 4 image 180) and 1.6 x 2.1 cm, SUV max 9.0 (series 4 image 177). Bilateral hypermetabolic inguinal lymph nodes. Physiologic activity is noted in the liver, spleen, pancreas, bowel, adrenal glands, and urinary collecting system. Fibroid uterus. Right ovary 2.8 cm dominant follicle/cyst.  Musculoskeletal: No abnormal foci of uptake in the osseous structures.  Other: Focal FDG uptake within the central canal extending from T5 to T7, most prominent at the T6 level, SUV max 10.0, corresponding to epidural lesion described on recent MRI of the thoracic spine.   IMPRESSION: 1. Corresponding to epidural lesion described on MRI of the thoracic spine from 8/20/2024, there is focal FDG uptake, SUV max 10.0, within the central canal at T5-T7, most prominent at the T6 level. Correlation with tissue sampling may be considered. 2. FDG avid cervical, supraclavicular, axillary, retroperitoneal, mesenteric, and inguinal lymphadenopathy, suspicious for metastatic disease/additional sites of disease. If clinically indicated, alternatively a hypermetabolic 1.6 cm right external iliac node, SUV max 9.0 (series 4 image 177) may be considered for tissue sampling. 3. Hypermetabolic left parotid 0.5 cm, SUV max 5.1, hyperdense nodule. This could represent pathologic intraparotid lymph node although primary parotid neoplasm, such as pleomorphic adenoma, cannot be excluded. 4. Diffusely FDG avid thyroid gland suggestive of thyroiditis. Recommend correlation with TSH levels.  --- End of Report ---

## 2024-09-09 NOTE — REASON FOR VISIT
[Home] : at home, [unfilled] , at the time of the visit. [Medical Office: ([facility])___] : at the medical office located in  [Verbal consent obtained from patient] : the patient, [unfilled] [FreeTextEntry1] : T5-T7 lesion

## 2024-09-09 NOTE — REASON FOR VISIT
[Home] : at home, [unfilled] , at the time of the visit. [Medical Office: ([facility])___] : at the medical office located in  [Verbal consent obtained from patient] : the patient, [unfilled] [FreeTextEntry1] : T5-T7 lesion

## 2024-09-09 NOTE — DATA REVIEWED
[de-identified] :    ACC: [id number] EXAM: MR SPINE THORACIC WAW IC ORDERED BY: LORIE JAVIER  PROCEDURE DATE: 08/28/2024    INTERPRETATION: EXAMINATION: MR THORACIC SPINE WITHOUT AND WITH IV CONTRAST  CLINICAL INDICATION: eval infection/compression TECHNIQUE: Multiplanar MRI images of the thoracic spine were obtained before and after IV injection of gadolinium, 10 cc administered. COMPARISON: None.  FINDINGS:  A space-occupying process is noted in the left dorsal epidural space centered at T6, measuring approximately 7 x 11 x 30 mm. The lesion is hypointense before gadolinium, and becomes confluent with adjacent epidural fat on the T2 and postgadolinium images. There is lateral extension to the left T5-6 and T6-7 neural foramina, which are mildly narrowed.  The appearance is nonspecific and could represent a lipid rich neoplasm vs epidural lipomatosis. Clinical correlation will be required to exclude endogenous or exogenous steroid excess. Differential diagnostic considerations would include both benign and malignant etiologies, including lipoma, spinal angiolipoma, meningioma, and Hirayama disease. There is associated mass effect on the cord and narrowing of the central canal related to this process:  T3-4: Mild narrowing of the central canal. T4-5: Moderate narrowing of the central canal, with effacement of CSF signal in the thecal sac. T5-6, T6-7: Moderate to severe central canal stenosis. Complete effacement of CSF signal, with flattening of the left dorsal cord, which appears compressed. Clinical correlation will be required to exclude acute myelopathic symptoms, which should be a clinical diagnosis. Consider surgical consultation. T7-8: Moderate narrowing of the central canal. T8-9: Mild narrowing of the central canal. T9-T10: Shallow right paracentral protrusion and bilateral facet arthritis, with mild narrowing of the central canal.  The central canal and neural foramina in the thoracic spine are otherwise negative. Vertebral body height, alignment, marrow, and cord are otherwise negative. The paraspinal musculature is normal in signal and bulk.  IMPRESSION:  1. 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 2. Correlate clinically to exclude steroid excess. Consider surgical consultation.    Patient Name: ELOY SOUTH MRN: [id number], Accession: [id number] DOS: 08/28/24 06:11 PM  --- End of Report ---     [de-identified] : ACC: [id number] EXAM: PETCT [facility] ONC FDG INIT ORDERED BY: SIS SOLBAM  PROCEDURE DATE: 08/29/2024    INTERPRETATION: 18F-FDG PET/CT  INDICATION: Spinal cord lesion. Assess for malignancy. Initial staging study.  COMPARISON: MRI of the thoracic spine from 8/20/2024. CT of the chest, abdomen, and pelvis from 8/29/2024.  TECHNIQUE: 12.3 mCi of F-18 FDG administered intravenously. After a 65 minute incubation time, PET images were obtained from skull base to mid thigh. Axial CT images were obtained for localization and attenuation correction only. Sagittal and coronal reformatted images were obtained. Fasting blood glucose level of 93 mg/dL.  FINDINGS: Head and Neck: Hypermetabolic cervical and supraclavicular lymph nodes including bilateral level IB, IIA, IIB, and III; representative nodes include left 1.2 x 1.2 cm, SUV max 6.1, node just anterior to the left submandibular gland (series 4 image 28) and hypermetabolic 1.3 x 1.1 cm, SUV max 4.0, left supraclavicular node at the level of the cricoid cartilage (level III; series 4 image 36). Marked diffuse FDG uptake throughout the thyroid. Hypermetabolic left parotid 0.5 cm, SUV max 5.1, hyperdense nodule (series 4 image 20). Physiologic uptake is noted in the salivary glands, oropharynx, and larynx.  Chest: Bilateral, left greater than right, hypermetabolic axillary lymph nodes with representative node on the left measuring 1.2 x 1.2 cm, SUV max 6.0 (series 4 image 60). No hypermetabolic pulmonary nodules. No hypermetabolic mediastinal lymphadenopathy. No abnormal foci of uptake in the breasts.  Abdomen and Pelvis: Few hypermetabolic retroperitoneal and mesenteric lymph nodes, most prominent within the pelvis including representative 1.8 x 2.0 cm, SUV max 4.8 left external iliac node (series 4 image 180) and 1.6 x 2.1 cm, SUV max 9.0 (series 4 image 177). Bilateral hypermetabolic inguinal lymph nodes. Physiologic activity is noted in the liver, spleen, pancreas, bowel, adrenal glands, and urinary collecting system. Fibroid uterus. Right ovary 2.8 cm dominant follicle/cyst.  Musculoskeletal: No abnormal foci of uptake in the osseous structures.  Other: Focal FDG uptake within the central canal extending from T5 to T7, most prominent at the T6 level, SUV max 10.0, corresponding to epidural lesion described on recent MRI of the thoracic spine.   IMPRESSION: 1. Corresponding to epidural lesion described on MRI of the thoracic spine from 8/20/2024, there is focal FDG uptake, SUV max 10.0, within the central canal at T5-T7, most prominent at the T6 level. Correlation with tissue sampling may be considered. 2. FDG avid cervical, supraclavicular, axillary, retroperitoneal, mesenteric, and inguinal lymphadenopathy, suspicious for metastatic disease/additional sites of disease. If clinically indicated, alternatively a hypermetabolic 1.6 cm right external iliac node, SUV max 9.0 (series 4 image 177) may be considered for tissue sampling. 3. Hypermetabolic left parotid 0.5 cm, SUV max 5.1, hyperdense nodule. This could represent pathologic intraparotid lymph node although primary parotid neoplasm, such as pleomorphic adenoma, cannot be excluded. 4. Diffusely FDG avid thyroid gland suggestive of thyroiditis. Recommend correlation with TSH levels.  --- End of Report ---

## 2024-09-17 ENCOUNTER — APPOINTMENT (OUTPATIENT)
Dept: NEUROSURGERY | Facility: CLINIC | Age: 44
End: 2024-09-17

## 2024-09-17 ENCOUNTER — APPOINTMENT (OUTPATIENT)
Dept: HEMATOLOGY ONCOLOGY | Facility: CLINIC | Age: 44
End: 2024-09-17